# Patient Record
Sex: MALE | Race: WHITE | Employment: FULL TIME | URBAN - NONMETROPOLITAN AREA
[De-identification: names, ages, dates, MRNs, and addresses within clinical notes are randomized per-mention and may not be internally consistent; named-entity substitution may affect disease eponyms.]

---

## 2017-03-01 RX ORDER — BUTALBITAL, ACETAMINOPHEN AND CAFFEINE 50; 325; 40 MG/1; MG/1; MG/1
TABLET ORAL
Qty: 60 TABLET | Refills: 2 | Status: SHIPPED | OUTPATIENT
Start: 2017-03-01 | End: 2017-05-10 | Stop reason: SDUPTHER

## 2017-04-04 ENCOUNTER — OFFICE VISIT (OUTPATIENT)
Dept: NEUROLOGY | Age: 22
End: 2017-04-04
Payer: MEDICARE

## 2017-04-04 VITALS
WEIGHT: 241.6 LBS | SYSTOLIC BLOOD PRESSURE: 138 MMHG | BODY MASS INDEX: 32.72 KG/M2 | DIASTOLIC BLOOD PRESSURE: 92 MMHG | HEIGHT: 72 IN | HEART RATE: 105 BPM

## 2017-04-04 DIAGNOSIS — S09.90XS HEAD INJURY, SEQUELA: ICD-10-CM

## 2017-04-04 DIAGNOSIS — Z82.0 FH: MIGRAINE HEADACHE: ICD-10-CM

## 2017-04-04 DIAGNOSIS — E11.8 TYPE 2 DIABETES MELLITUS WITH COMPLICATION, WITHOUT LONG-TERM CURRENT USE OF INSULIN (HCC): ICD-10-CM

## 2017-04-04 DIAGNOSIS — E78.2 MIXED HYPERLIPIDEMIA: ICD-10-CM

## 2017-04-04 DIAGNOSIS — G44.209 TENSION HEADACHE: ICD-10-CM

## 2017-04-04 DIAGNOSIS — G43.909 MIGRAINE WITHOUT STATUS MIGRAINOSUS, NOT INTRACTABLE, UNSPECIFIED MIGRAINE TYPE: ICD-10-CM

## 2017-04-04 DIAGNOSIS — I10 ESSENTIAL HYPERTENSION: ICD-10-CM

## 2017-04-04 DIAGNOSIS — G80.9 CEREBRAL PALSY, UNSPECIFIED TYPE (HCC): ICD-10-CM

## 2017-04-04 DIAGNOSIS — G43.919 INTRACTABLE MIGRAINE WITHOUT STATUS MIGRAINOSUS, UNSPECIFIED MIGRAINE TYPE: Primary | ICD-10-CM

## 2017-04-04 DIAGNOSIS — R73.09 ELEVATED HEMOGLOBIN A1C: ICD-10-CM

## 2017-04-04 DIAGNOSIS — R26.89 BALANCE PROBLEM: ICD-10-CM

## 2017-04-04 DIAGNOSIS — Z91.81 H/O FALL: ICD-10-CM

## 2017-04-04 DIAGNOSIS — R42 DIZZINESS: ICD-10-CM

## 2017-04-04 DIAGNOSIS — R41.3 MEMORY LOSS: ICD-10-CM

## 2017-04-04 DIAGNOSIS — R53.1 LEFT-SIDED WEAKNESS: ICD-10-CM

## 2017-04-04 DIAGNOSIS — Z87.828 OLD HEAD INJURY: ICD-10-CM

## 2017-04-04 PROCEDURE — 99215 OFFICE O/P EST HI 40 MIN: CPT | Performed by: PSYCHIATRY & NEUROLOGY

## 2017-04-04 ASSESSMENT — ENCOUNTER SYMPTOMS
BLOOD IN STOOL: 0
VOICE CHANGE: 0
COLOR CHANGE: 0
DIARRHEA: 0
EYE ITCHING: 0
FACIAL SWELLING: 0
WHEEZING: 0
TROUBLE SWALLOWING: 0
EYE DISCHARGE: 0
CHOKING: 0
CONSTIPATION: 0
SHORTNESS OF BREATH: 0
ABDOMINAL DISTENTION: 0
APNEA: 0
CHEST TIGHTNESS: 0

## 2017-05-10 RX ORDER — BUTALBITAL, ACETAMINOPHEN AND CAFFEINE 50; 325; 40 MG/1; MG/1; MG/1
TABLET ORAL
Qty: 60 TABLET | Refills: 2 | Status: SHIPPED | OUTPATIENT
Start: 2017-05-10 | End: 2017-08-19 | Stop reason: SDUPTHER

## 2017-05-15 ENCOUNTER — OFFICE VISIT (OUTPATIENT)
Dept: OPTOMETRY | Age: 22
End: 2017-05-15
Payer: COMMERCIAL

## 2017-05-15 DIAGNOSIS — S05.01XA CORNEAL ABRASION, RIGHT, INITIAL ENCOUNTER: Primary | ICD-10-CM

## 2017-05-15 PROCEDURE — 99203 OFFICE O/P NEW LOW 30 MIN: CPT | Performed by: OPTOMETRIST

## 2017-05-15 RX ORDER — BENOXINATE HCL/FLUORESCEIN SOD 0.4%-0.25%
1 DROPS OPHTHALMIC (EYE) ONCE
Status: COMPLETED | OUTPATIENT
Start: 2017-05-15 | End: 2017-05-15

## 2017-05-15 RX ORDER — POLYMYXIN B SULFATE AND TRIMETHOPRIM 1; 10000 MG/ML; [USP'U]/ML
1 SOLUTION OPHTHALMIC EVERY 4 HOURS
Qty: 1 BOTTLE | Refills: 0 | Status: SHIPPED | OUTPATIENT
Start: 2017-05-15 | End: 2017-05-25

## 2017-05-15 RX ADMIN — Medication 1 DROP: at 12:07

## 2017-05-15 ASSESSMENT — VISUAL ACUITY
METHOD: SNELLEN - LINEAR
OS_SC+: -1
OD_SC: 20/100
OS_SC: 20/30

## 2017-08-21 RX ORDER — BUTALBITAL, ACETAMINOPHEN AND CAFFEINE 50; 325; 40 MG/1; MG/1; MG/1
TABLET ORAL
Qty: 60 TABLET | Refills: 2 | Status: SHIPPED | OUTPATIENT
Start: 2017-08-21 | End: 2017-11-06 | Stop reason: SDUPTHER

## 2017-11-06 RX ORDER — BUTALBITAL, ACETAMINOPHEN AND CAFFEINE 50; 325; 40 MG/1; MG/1; MG/1
TABLET ORAL
Qty: 60 TABLET | Refills: 0 | Status: SHIPPED | OUTPATIENT
Start: 2017-11-06

## 2021-07-31 ENCOUNTER — HOSPITAL ENCOUNTER (INPATIENT)
Age: 26
LOS: 6 days | Discharge: HOME | DRG: 177 | End: 2021-08-06
Attending: EMERGENCY MEDICINE | Admitting: FAMILY MEDICINE
Payer: OTHER GOVERNMENT

## 2021-07-31 ENCOUNTER — APPOINTMENT (OUTPATIENT)
Dept: GENERAL RADIOLOGY | Age: 26
DRG: 177 | End: 2021-07-31

## 2021-07-31 DIAGNOSIS — E08.10 DIABETIC KETOACIDOSIS WITHOUT COMA ASSOCIATED WITH DIABETES MELLITUS DUE TO UNDERLYING CONDITION (HCC): Primary | ICD-10-CM

## 2021-07-31 DIAGNOSIS — U07.1 COVID-19 VIRUS DETECTED: ICD-10-CM

## 2021-07-31 PROBLEM — E11.10 DKA, TYPE 2, NOT AT GOAL (HCC): Status: ACTIVE | Noted: 2021-07-31

## 2021-07-31 LAB
ABSOLUTE EOS #: <0.03 K/UL (ref 0–0.44)
ABSOLUTE IMMATURE GRANULOCYTE: 0.32 K/UL (ref 0–0.3)
ABSOLUTE LYMPH #: 1.22 K/UL (ref 1.1–3.7)
ABSOLUTE MONO #: 0.82 K/UL (ref 0.1–1.2)
ALBUMIN SERPL-MCNC: 4.4 G/DL (ref 3.5–5.2)
ALBUMIN/GLOBULIN RATIO: 1 (ref 1–2.5)
ALP BLD-CCNC: 118 U/L (ref 40–129)
ALT SERPL-CCNC: 21 U/L (ref 5–41)
ANION GAP SERPL CALCULATED.3IONS-SCNC: ABNORMAL MMOL/L (ref 9–17)
AST SERPL-CCNC: 18 U/L
BASOPHILS # BLD: 0 % (ref 0–2)
BASOPHILS ABSOLUTE: 0.06 K/UL (ref 0–0.2)
BETA-HYDROXYBUTYRATE: 7.94 MMOL/L (ref 0.02–0.27)
BILIRUB SERPL-MCNC: 0.27 MG/DL (ref 0.3–1.2)
BUN BLDV-MCNC: 21 MG/DL (ref 6–20)
BUN/CREAT BLD: 14 (ref 9–20)
CALCIUM SERPL-MCNC: 9.7 MG/DL (ref 8.6–10.4)
CHLORIDE BLD-SCNC: 88 MMOL/L (ref 98–107)
CO2: <6 MMOL/L (ref 20–31)
CREAT SERPL-MCNC: 1.49 MG/DL (ref 0.7–1.2)
DIFFERENTIAL TYPE: ABNORMAL
EOSINOPHILS RELATIVE PERCENT: 0 % (ref 1–4)
GFR AFRICAN AMERICAN: >60 ML/MIN
GFR NON-AFRICAN AMERICAN: 57 ML/MIN
GFR SERPL CREATININE-BSD FRML MDRD: ABNORMAL ML/MIN/{1.73_M2}
GFR SERPL CREATININE-BSD FRML MDRD: ABNORMAL ML/MIN/{1.73_M2}
GLUCOSE BLD-MCNC: 406 MG/DL (ref 75–110)
GLUCOSE BLD-MCNC: 451 MG/DL (ref 70–99)
GLUCOSE BLD-MCNC: 455 MG/DL (ref 75–110)
HCT VFR BLD CALC: 60.8 % (ref 40.7–50.3)
HEMOGLOBIN: 20.3 G/DL (ref 13–17)
IMMATURE GRANULOCYTES: 2 %
LYMPHOCYTES # BLD: 9 % (ref 24–43)
MCH RBC QN AUTO: 30.3 PG (ref 25.2–33.5)
MCHC RBC AUTO-ENTMCNC: 33.4 G/DL (ref 25.2–33.5)
MCV RBC AUTO: 90.9 FL (ref 82.6–102.9)
MONOCYTES # BLD: 6 % (ref 3–12)
NRBC AUTOMATED: 0 PER 100 WBC
PDW BLD-RTO: 12.2 % (ref 11.8–14.4)
PLATELET # BLD: 291 K/UL (ref 138–453)
PLATELET ESTIMATE: ABNORMAL
PMV BLD AUTO: 8.9 FL (ref 8.1–13.5)
POTASSIUM SERPL-SCNC: 4.4 MMOL/L (ref 3.7–5.3)
RBC # BLD: 6.69 M/UL (ref 4.21–5.77)
RBC # BLD: ABNORMAL 10*6/UL
SARS-COV-2, RAPID: DETECTED
SEG NEUTROPHILS: 82 % (ref 36–65)
SEGMENTED NEUTROPHILS ABSOLUTE COUNT: 11.22 K/UL (ref 1.5–8.1)
SODIUM BLD-SCNC: 130 MMOL/L (ref 135–144)
SPECIMEN DESCRIPTION: ABNORMAL
TOTAL PROTEIN: 9 G/DL (ref 6.4–8.3)
TROPONIN INTERP: NORMAL
TROPONIN T: NORMAL NG/ML
TROPONIN, HIGH SENSITIVITY: <6 NG/L (ref 0–22)
WBC # BLD: 13.6 K/UL (ref 3.5–11.3)
WBC # BLD: ABNORMAL 10*3/UL

## 2021-07-31 PROCEDURE — 82947 ASSAY GLUCOSE BLOOD QUANT: CPT

## 2021-07-31 PROCEDURE — 80053 COMPREHEN METABOLIC PANEL: CPT

## 2021-07-31 PROCEDURE — 96360 HYDRATION IV INFUSION INIT: CPT

## 2021-07-31 PROCEDURE — 2000000000 HC ICU R&B

## 2021-07-31 PROCEDURE — 93005 ELECTROCARDIOGRAM TRACING: CPT | Performed by: EMERGENCY MEDICINE

## 2021-07-31 PROCEDURE — 82010 KETONE BODYS QUAN: CPT

## 2021-07-31 PROCEDURE — 99285 EMERGENCY DEPT VISIT HI MDM: CPT

## 2021-07-31 PROCEDURE — 84484 ASSAY OF TROPONIN QUANT: CPT

## 2021-07-31 PROCEDURE — 81001 URINALYSIS AUTO W/SCOPE: CPT

## 2021-07-31 PROCEDURE — 2580000003 HC RX 258: Performed by: EMERGENCY MEDICINE

## 2021-07-31 PROCEDURE — 85025 COMPLETE CBC W/AUTO DIFF WBC: CPT

## 2021-07-31 PROCEDURE — 2500000003 HC RX 250 WO HCPCS: Performed by: EMERGENCY MEDICINE

## 2021-07-31 PROCEDURE — 87635 SARS-COV-2 COVID-19 AMP PRB: CPT

## 2021-07-31 PROCEDURE — 36415 COLL VENOUS BLD VENIPUNCTURE: CPT

## 2021-07-31 PROCEDURE — 71045 X-RAY EXAM CHEST 1 VIEW: CPT

## 2021-07-31 PROCEDURE — 6370000000 HC RX 637 (ALT 250 FOR IP): Performed by: EMERGENCY MEDICINE

## 2021-07-31 PROCEDURE — 2060000000 HC ICU INTERMEDIATE R&B

## 2021-07-31 RX ORDER — 0.9 % SODIUM CHLORIDE 0.9 %
1000 INTRAVENOUS SOLUTION INTRAVENOUS ONCE
Status: COMPLETED | OUTPATIENT
Start: 2021-07-31 | End: 2021-07-31

## 2021-07-31 RX ORDER — POTASSIUM CHLORIDE 20 MEQ/1
40 TABLET, EXTENDED RELEASE ORAL 2 TIMES DAILY
Status: DISCONTINUED | OUTPATIENT
Start: 2021-07-31 | End: 2021-08-02

## 2021-07-31 RX ORDER — SODIUM CHLORIDE, SODIUM LACTATE, POTASSIUM CHLORIDE, CALCIUM CHLORIDE 600; 310; 30; 20 MG/100ML; MG/100ML; MG/100ML; MG/100ML
INJECTION, SOLUTION INTRAVENOUS CONTINUOUS
Status: DISCONTINUED | OUTPATIENT
Start: 2021-07-31 | End: 2021-08-01

## 2021-07-31 RX ADMIN — SODIUM CHLORIDE, POTASSIUM CHLORIDE, SODIUM LACTATE AND CALCIUM CHLORIDE: 600; 310; 30; 20 INJECTION, SOLUTION INTRAVENOUS at 23:35

## 2021-07-31 RX ADMIN — POTASSIUM CHLORIDE 40 MEQ: 20 TABLET, EXTENDED RELEASE ORAL at 23:35

## 2021-07-31 RX ADMIN — SODIUM CHLORIDE 1000 ML: 9 INJECTION, SOLUTION INTRAVENOUS at 22:05

## 2021-07-31 RX ADMIN — SODIUM CHLORIDE 1000 ML: 9 INJECTION, SOLUTION INTRAVENOUS at 22:58

## 2021-07-31 RX ADMIN — Medication 5 UNITS/HR: at 23:38

## 2021-08-01 ENCOUNTER — APPOINTMENT (OUTPATIENT)
Dept: CT IMAGING | Age: 26
DRG: 177 | End: 2021-08-01

## 2021-08-01 LAB
-: ABNORMAL
AMORPHOUS: ABNORMAL
ANION GAP SERPL CALCULATED.3IONS-SCNC: 16 MMOL/L (ref 9–17)
ANION GAP SERPL CALCULATED.3IONS-SCNC: 19 MMOL/L (ref 9–17)
ANION GAP SERPL CALCULATED.3IONS-SCNC: 19 MMOL/L (ref 9–17)
ANION GAP SERPL CALCULATED.3IONS-SCNC: 20 MMOL/L (ref 9–17)
ANION GAP SERPL CALCULATED.3IONS-SCNC: 21 MMOL/L (ref 9–17)
ANION GAP SERPL CALCULATED.3IONS-SCNC: 26 MMOL/L (ref 9–17)
BACTERIA: ABNORMAL
BETA-HYDROXYBUTYRATE: 3.73 MMOL/L (ref 0.02–0.27)
BILIRUBIN URINE: NEGATIVE
BUN BLDV-MCNC: 11 MG/DL (ref 6–20)
BUN BLDV-MCNC: 13 MG/DL (ref 6–20)
BUN BLDV-MCNC: 14 MG/DL (ref 6–20)
BUN BLDV-MCNC: 15 MG/DL (ref 6–20)
BUN BLDV-MCNC: 16 MG/DL (ref 6–20)
BUN BLDV-MCNC: 19 MG/DL (ref 6–20)
BUN/CREAT BLD: 16 (ref 9–20)
BUN/CREAT BLD: 17 (ref 9–20)
BUN/CREAT BLD: 19 (ref 9–20)
BUN/CREAT BLD: 20 (ref 9–20)
C-REACTIVE PROTEIN: 15.5 MG/L (ref 0–5)
CALCIUM SERPL-MCNC: 8.4 MG/DL (ref 8.6–10.4)
CALCIUM SERPL-MCNC: 8.7 MG/DL (ref 8.6–10.4)
CALCIUM SERPL-MCNC: 8.7 MG/DL (ref 8.6–10.4)
CALCIUM SERPL-MCNC: 8.8 MG/DL (ref 8.6–10.4)
CALCIUM SERPL-MCNC: 8.9 MG/DL (ref 8.6–10.4)
CALCIUM SERPL-MCNC: 8.9 MG/DL (ref 8.6–10.4)
CASTS UA: ABNORMAL /LPF (ref 0–2)
CASTS UA: ABNORMAL /LPF (ref 0–2)
CHLORIDE BLD-SCNC: 100 MMOL/L (ref 98–107)
CHLORIDE BLD-SCNC: 100 MMOL/L (ref 98–107)
CHLORIDE BLD-SCNC: 102 MMOL/L (ref 98–107)
CHLORIDE BLD-SCNC: 103 MMOL/L (ref 98–107)
CHLORIDE BLD-SCNC: 98 MMOL/L (ref 98–107)
CHLORIDE BLD-SCNC: 99 MMOL/L (ref 98–107)
CO2: 10 MMOL/L (ref 20–31)
CO2: 11 MMOL/L (ref 20–31)
CO2: 6 MMOL/L (ref 20–31)
CO2: 7 MMOL/L (ref 20–31)
CO2: 8 MMOL/L (ref 20–31)
CO2: 9 MMOL/L (ref 20–31)
COLOR: ABNORMAL
COMMENT UA: ABNORMAL
CREAT SERPL-MCNC: 0.67 MG/DL (ref 0.7–1.2)
CREAT SERPL-MCNC: 0.69 MG/DL (ref 0.7–1.2)
CREAT SERPL-MCNC: 0.75 MG/DL (ref 0.7–1.2)
CREAT SERPL-MCNC: 0.76 MG/DL (ref 0.7–1.2)
CREAT SERPL-MCNC: 0.93 MG/DL (ref 0.7–1.2)
CREAT SERPL-MCNC: 0.98 MG/DL (ref 0.7–1.2)
CRYSTALS, UA: ABNORMAL /HPF
D-DIMER QUANTITATIVE: 2.98 MG/L FEU (ref 0–0.59)
EPITHELIAL CELLS UA: ABNORMAL /HPF (ref 0–5)
GFR AFRICAN AMERICAN: >60 ML/MIN
GFR NON-AFRICAN AMERICAN: >60 ML/MIN
GFR SERPL CREATININE-BSD FRML MDRD: ABNORMAL ML/MIN/{1.73_M2}
GLUCOSE BLD-MCNC: 101 MG/DL (ref 75–110)
GLUCOSE BLD-MCNC: 102 MG/DL (ref 70–99)
GLUCOSE BLD-MCNC: 104 MG/DL (ref 75–110)
GLUCOSE BLD-MCNC: 109 MG/DL (ref 70–99)
GLUCOSE BLD-MCNC: 114 MG/DL (ref 75–110)
GLUCOSE BLD-MCNC: 120 MG/DL (ref 75–110)
GLUCOSE BLD-MCNC: 128 MG/DL (ref 75–110)
GLUCOSE BLD-MCNC: 129 MG/DL (ref 75–110)
GLUCOSE BLD-MCNC: 134 MG/DL (ref 70–99)
GLUCOSE BLD-MCNC: 140 MG/DL (ref 75–110)
GLUCOSE BLD-MCNC: 160 MG/DL (ref 75–110)
GLUCOSE BLD-MCNC: 162 MG/DL (ref 75–110)
GLUCOSE BLD-MCNC: 162 MG/DL (ref 75–110)
GLUCOSE BLD-MCNC: 164 MG/DL (ref 75–110)
GLUCOSE BLD-MCNC: 168 MG/DL (ref 70–99)
GLUCOSE BLD-MCNC: 180 MG/DL (ref 75–110)
GLUCOSE BLD-MCNC: 201 MG/DL (ref 75–110)
GLUCOSE BLD-MCNC: 204 MG/DL (ref 75–110)
GLUCOSE BLD-MCNC: 205 MG/DL (ref 75–110)
GLUCOSE BLD-MCNC: 209 MG/DL (ref 70–99)
GLUCOSE BLD-MCNC: 219 MG/DL (ref 75–110)
GLUCOSE BLD-MCNC: 250 MG/DL (ref 70–99)
GLUCOSE BLD-MCNC: 255 MG/DL (ref 75–110)
GLUCOSE BLD-MCNC: 293 MG/DL (ref 75–110)
GLUCOSE BLD-MCNC: 315 MG/DL (ref 75–110)
GLUCOSE BLD-MCNC: 88 MG/DL (ref 75–110)
GLUCOSE BLD-MCNC: 96 MG/DL (ref 75–110)
GLUCOSE BLD-MCNC: 96 MG/DL (ref 75–110)
GLUCOSE URINE: ABNORMAL
HCT VFR BLD CALC: 51.8 % (ref 40.7–50.3)
HEMOGLOBIN: 18 G/DL (ref 13–17)
INR BLD: 1.2
KETONES, URINE: ABNORMAL
LACTIC ACID, SEPSIS WHOLE BLOOD: NORMAL MMOL/L (ref 0.5–1.9)
LACTIC ACID, SEPSIS: 1.9 MMOL/L (ref 0.5–1.9)
LEUKOCYTE ESTERASE, URINE: NEGATIVE
MAGNESIUM: 2.1 MG/DL (ref 1.6–2.6)
MAGNESIUM: 2.2 MG/DL (ref 1.6–2.6)
MCH RBC QN AUTO: 30.4 PG (ref 25.2–33.5)
MCHC RBC AUTO-ENTMCNC: 34.7 G/DL (ref 25.2–33.5)
MCV RBC AUTO: 87.5 FL (ref 82.6–102.9)
MUCUS: ABNORMAL
NITRITE, URINE: NEGATIVE
NRBC AUTOMATED: ABNORMAL PER 100 WBC
OTHER OBSERVATIONS UA: ABNORMAL
PDW BLD-RTO: 12.2 % (ref 11.8–14.4)
PH UA: 6 (ref 5–6)
PHOSPHORUS: 0.8 MG/DL (ref 2.5–4.5)
PHOSPHORUS: 1.2 MG/DL (ref 2.5–4.5)
PHOSPHORUS: 1.3 MG/DL (ref 2.5–4.5)
PHOSPHORUS: 1.6 MG/DL (ref 2.5–4.5)
PHOSPHORUS: 1.8 MG/DL (ref 2.5–4.5)
PHOSPHORUS: 2.1 MG/DL (ref 2.5–4.5)
PLATELET # BLD: 200 K/UL (ref 138–453)
PMV BLD AUTO: 8.7 FL (ref 8.1–13.5)
POTASSIUM SERPL-SCNC: 3.8 MMOL/L (ref 3.7–5.3)
POTASSIUM SERPL-SCNC: 4.1 MMOL/L (ref 3.7–5.3)
POTASSIUM SERPL-SCNC: 4.2 MMOL/L (ref 3.7–5.3)
POTASSIUM SERPL-SCNC: 4.2 MMOL/L (ref 3.7–5.3)
POTASSIUM SERPL-SCNC: 4.6 MMOL/L (ref 3.7–5.3)
POTASSIUM SERPL-SCNC: 4.8 MMOL/L (ref 3.7–5.3)
PROCALCITONIN: 0.42 NG/ML
PROTEIN UA: ABNORMAL
PROTHROMBIN TIME: 14.6 SEC (ref 11.5–14.2)
RBC # BLD: 5.92 M/UL (ref 4.21–5.77)
RBC UA: ABNORMAL /HPF (ref 0–4)
RENAL EPITHELIAL, UA: ABNORMAL /HPF
SODIUM BLD-SCNC: 126 MMOL/L (ref 135–144)
SODIUM BLD-SCNC: 128 MMOL/L (ref 135–144)
SODIUM BLD-SCNC: 129 MMOL/L (ref 135–144)
SODIUM BLD-SCNC: 129 MMOL/L (ref 135–144)
SODIUM BLD-SCNC: 130 MMOL/L (ref 135–144)
SODIUM BLD-SCNC: 132 MMOL/L (ref 135–144)
SPECIFIC GRAVITY UA: 1.03 (ref 1.01–1.02)
TRICHOMONAS: ABNORMAL
TROPONIN INTERP: NORMAL
TROPONIN T: NORMAL NG/ML
TROPONIN, HIGH SENSITIVITY: <6 NG/L (ref 0–22)
TURBIDITY: ABNORMAL
URINE HGB: ABNORMAL
UROBILINOGEN, URINE: NORMAL
WBC # BLD: 12.6 K/UL (ref 3.5–11.3)
WBC UA: ABNORMAL /HPF (ref 0–4)
YEAST: ABNORMAL

## 2021-08-01 PROCEDURE — 84145 PROCALCITONIN (PCT): CPT

## 2021-08-01 PROCEDURE — 6370000000 HC RX 637 (ALT 250 FOR IP): Performed by: NURSE PRACTITIONER

## 2021-08-01 PROCEDURE — 87040 BLOOD CULTURE FOR BACTERIA: CPT

## 2021-08-01 PROCEDURE — 87899 AGENT NOS ASSAY W/OPTIC: CPT

## 2021-08-01 PROCEDURE — 83605 ASSAY OF LACTIC ACID: CPT

## 2021-08-01 PROCEDURE — 87205 SMEAR GRAM STAIN: CPT

## 2021-08-01 PROCEDURE — 2000000000 HC ICU R&B

## 2021-08-01 PROCEDURE — 94761 N-INVAS EAR/PLS OXIMETRY MLT: CPT

## 2021-08-01 PROCEDURE — 86140 C-REACTIVE PROTEIN: CPT

## 2021-08-01 PROCEDURE — 83036 HEMOGLOBIN GLYCOSYLATED A1C: CPT

## 2021-08-01 PROCEDURE — 87449 NOS EACH ORGANISM AG IA: CPT

## 2021-08-01 PROCEDURE — 82947 ASSAY GLUCOSE BLOOD QUANT: CPT

## 2021-08-01 PROCEDURE — 2500000003 HC RX 250 WO HCPCS: Performed by: NURSE PRACTITIONER

## 2021-08-01 PROCEDURE — 84484 ASSAY OF TROPONIN QUANT: CPT

## 2021-08-01 PROCEDURE — 85610 PROTHROMBIN TIME: CPT

## 2021-08-01 PROCEDURE — 36415 COLL VENOUS BLD VENIPUNCTURE: CPT

## 2021-08-01 PROCEDURE — 6360000004 HC RX CONTRAST MEDICATION: Performed by: NURSE PRACTITIONER

## 2021-08-01 PROCEDURE — 6370000000 HC RX 637 (ALT 250 FOR IP): Performed by: FAMILY MEDICINE

## 2021-08-01 PROCEDURE — 87150 DNA/RNA AMPLIFIED PROBE: CPT

## 2021-08-01 PROCEDURE — 82010 KETONE BODYS QUAN: CPT

## 2021-08-01 PROCEDURE — 87077 CULTURE AEROBIC IDENTIFY: CPT

## 2021-08-01 PROCEDURE — 6360000002 HC RX W HCPCS: Performed by: NURSE PRACTITIONER

## 2021-08-01 PROCEDURE — 2580000003 HC RX 258: Performed by: FAMILY MEDICINE

## 2021-08-01 PROCEDURE — 2580000003 HC RX 258: Performed by: NURSE PRACTITIONER

## 2021-08-01 PROCEDURE — 71260 CT THORAX DX C+: CPT

## 2021-08-01 PROCEDURE — 87186 SC STD MICRODIL/AGAR DIL: CPT

## 2021-08-01 PROCEDURE — 99222 1ST HOSP IP/OBS MODERATE 55: CPT | Performed by: FAMILY MEDICINE

## 2021-08-01 PROCEDURE — 6370000000 HC RX 637 (ALT 250 FOR IP): Performed by: EMERGENCY MEDICINE

## 2021-08-01 PROCEDURE — 83735 ASSAY OF MAGNESIUM: CPT

## 2021-08-01 PROCEDURE — 80048 BASIC METABOLIC PNL TOTAL CA: CPT

## 2021-08-01 PROCEDURE — 85379 FIBRIN DEGRADATION QUANT: CPT

## 2021-08-01 PROCEDURE — 85027 COMPLETE CBC AUTOMATED: CPT

## 2021-08-01 PROCEDURE — 6360000002 HC RX W HCPCS: Performed by: FAMILY MEDICINE

## 2021-08-01 PROCEDURE — 84100 ASSAY OF PHOSPHORUS: CPT

## 2021-08-01 RX ORDER — SODIUM PHOSPHATE, MONOBASIC, MONOHYDRATE 276; 142 MG/ML; MG/ML
INJECTION, SOLUTION INTRAVENOUS
Status: DISPENSED
Start: 2021-08-01 | End: 2021-08-01

## 2021-08-01 RX ORDER — POLYETHYLENE GLYCOL 3350 17 G/17G
17 POWDER, FOR SOLUTION ORAL DAILY PRN
Status: DISCONTINUED | OUTPATIENT
Start: 2021-08-01 | End: 2021-08-06 | Stop reason: HOSPADM

## 2021-08-01 RX ORDER — TOPIRAMATE 100 MG/1
100 TABLET, FILM COATED ORAL 2 TIMES DAILY
Status: DISCONTINUED | OUTPATIENT
Start: 2021-08-01 | End: 2021-08-01

## 2021-08-01 RX ORDER — FAMOTIDINE 20 MG/1
20 TABLET, FILM COATED ORAL DAILY
Status: DISCONTINUED | OUTPATIENT
Start: 2021-08-01 | End: 2021-08-01

## 2021-08-01 RX ORDER — DEXTROSE AND SODIUM CHLORIDE 5; .45 G/100ML; G/100ML
INJECTION, SOLUTION INTRAVENOUS CONTINUOUS PRN
Status: DISCONTINUED | OUTPATIENT
Start: 2021-08-01 | End: 2021-08-01

## 2021-08-01 RX ORDER — POTASSIUM CHLORIDE 7.45 MG/ML
10 INJECTION INTRAVENOUS PRN
Status: DISCONTINUED | OUTPATIENT
Start: 2021-08-01 | End: 2021-08-06 | Stop reason: HOSPADM

## 2021-08-01 RX ORDER — METOPROLOL TARTRATE 5 MG/5ML
5 INJECTION INTRAVENOUS ONCE
Status: COMPLETED | OUTPATIENT
Start: 2021-08-01 | End: 2021-08-01

## 2021-08-01 RX ORDER — NAPROXEN 250 MG/1
500 TABLET ORAL 2 TIMES DAILY WITH MEALS
Status: DISCONTINUED | OUTPATIENT
Start: 2021-08-01 | End: 2021-08-01

## 2021-08-01 RX ORDER — MAGNESIUM SULFATE 1 G/100ML
1000 INJECTION INTRAVENOUS PRN
Status: DISCONTINUED | OUTPATIENT
Start: 2021-08-01 | End: 2021-08-06 | Stop reason: HOSPADM

## 2021-08-01 RX ORDER — ACETAMINOPHEN 650 MG/1
650 SUPPOSITORY RECTAL EVERY 6 HOURS PRN
Status: DISCONTINUED | OUTPATIENT
Start: 2021-08-01 | End: 2021-08-01

## 2021-08-01 RX ORDER — SODIUM CHLORIDE 9 MG/ML
INJECTION, SOLUTION INTRAVENOUS CONTINUOUS
Status: DISCONTINUED | OUTPATIENT
Start: 2021-08-01 | End: 2021-08-03

## 2021-08-01 RX ORDER — ACETAMINOPHEN 325 MG/1
650 TABLET ORAL EVERY 6 HOURS PRN
Status: DISCONTINUED | OUTPATIENT
Start: 2021-08-01 | End: 2021-08-06 | Stop reason: HOSPADM

## 2021-08-01 RX ORDER — ACETAMINOPHEN 650 MG/1
650 SUPPOSITORY RECTAL EVERY 6 HOURS PRN
Status: DISCONTINUED | OUTPATIENT
Start: 2021-08-01 | End: 2021-08-06 | Stop reason: HOSPADM

## 2021-08-01 RX ORDER — PANTOPRAZOLE SODIUM 40 MG/1
40 TABLET, DELAYED RELEASE ORAL
Status: DISCONTINUED | OUTPATIENT
Start: 2021-08-01 | End: 2021-08-02

## 2021-08-01 RX ORDER — ALBUTEROL SULFATE 90 UG/1
2 AEROSOL, METERED RESPIRATORY (INHALATION) EVERY 6 HOURS PRN
Status: DISCONTINUED | OUTPATIENT
Start: 2021-08-01 | End: 2021-08-04

## 2021-08-01 RX ORDER — ACETAMINOPHEN 325 MG/1
650 TABLET ORAL EVERY 6 HOURS PRN
Status: DISCONTINUED | OUTPATIENT
Start: 2021-08-01 | End: 2021-08-01

## 2021-08-01 RX ORDER — INSULIN GLARGINE 100 [IU]/ML
20 INJECTION, SOLUTION SUBCUTANEOUS NIGHTLY
Status: DISCONTINUED | OUTPATIENT
Start: 2021-08-01 | End: 2021-08-06 | Stop reason: HOSPADM

## 2021-08-01 RX ORDER — DEXTROSE MONOHYDRATE 25 G/50ML
12.5 INJECTION, SOLUTION INTRAVENOUS PRN
Status: DISCONTINUED | OUTPATIENT
Start: 2021-08-01 | End: 2021-08-06 | Stop reason: HOSPADM

## 2021-08-01 RX ORDER — BUTALBITAL, ACETAMINOPHEN AND CAFFEINE 50; 325; 40 MG/1; MG/1; MG/1
1 TABLET ORAL NIGHTLY
Status: DISCONTINUED | OUTPATIENT
Start: 2021-08-01 | End: 2021-08-01

## 2021-08-01 RX ORDER — GUAIFENESIN/DEXTROMETHORPHAN 100-10MG/5
5 SYRUP ORAL EVERY 4 HOURS PRN
Status: DISCONTINUED | OUTPATIENT
Start: 2021-08-01 | End: 2021-08-06 | Stop reason: HOSPADM

## 2021-08-01 RX ORDER — VITAMIN B COMPLEX
2000 TABLET ORAL DAILY
Status: DISCONTINUED | OUTPATIENT
Start: 2021-08-01 | End: 2021-08-06 | Stop reason: HOSPADM

## 2021-08-01 RX ORDER — SODIUM CHLORIDE 450 MG/100ML
INJECTION, SOLUTION INTRAVENOUS CONTINUOUS
Status: DISCONTINUED | OUTPATIENT
Start: 2021-08-01 | End: 2021-08-01

## 2021-08-01 RX ADMIN — Medication 15.6 UNITS/HR: at 10:53

## 2021-08-01 RX ADMIN — ENOXAPARIN SODIUM 30 MG: 30 INJECTION SUBCUTANEOUS at 21:15

## 2021-08-01 RX ADMIN — SODIUM PHOSPHATE, MONOBASIC, MONOHYDRATE 20 MMOL: 276; 142 INJECTION, SOLUTION INTRAVENOUS at 09:28

## 2021-08-01 RX ADMIN — AZITHROMYCIN MONOHYDRATE 500 MG: 500 INJECTION, POWDER, LYOPHILIZED, FOR SOLUTION INTRAVENOUS at 09:41

## 2021-08-01 RX ADMIN — CEFTRIAXONE 1000 MG: 1 INJECTION, POWDER, FOR SOLUTION INTRAMUSCULAR; INTRAVENOUS at 10:55

## 2021-08-01 RX ADMIN — SODIUM CHLORIDE: 4.5 INJECTION, SOLUTION INTRAVENOUS at 01:34

## 2021-08-01 RX ADMIN — POTASSIUM CHLORIDE 40 MEQ: 20 TABLET, EXTENDED RELEASE ORAL at 21:24

## 2021-08-01 RX ADMIN — NAPROXEN 500 MG: 250 TABLET ORAL at 07:43

## 2021-08-01 RX ADMIN — IOPAMIDOL 100 ML: 755 INJECTION, SOLUTION INTRAVENOUS at 03:18

## 2021-08-01 RX ADMIN — INSULIN GLARGINE 20 UNITS: 100 INJECTION, SOLUTION SUBCUTANEOUS at 21:15

## 2021-08-01 RX ADMIN — DEXTROSE AND SODIUM CHLORIDE: 5; 450 INJECTION, SOLUTION INTRAVENOUS at 09:40

## 2021-08-01 RX ADMIN — DEXTROSE AND SODIUM CHLORIDE: 5; 450 INJECTION, SOLUTION INTRAVENOUS at 03:37

## 2021-08-01 RX ADMIN — SODIUM CHLORIDE: 9 INJECTION, SOLUTION INTRAVENOUS at 12:29

## 2021-08-01 RX ADMIN — METOPROLOL TARTRATE 5 MG: 5 INJECTION INTRAVENOUS at 02:54

## 2021-08-01 RX ADMIN — ENOXAPARIN SODIUM 30 MG: 30 INJECTION SUBCUTANEOUS at 01:33

## 2021-08-01 RX ADMIN — FAMOTIDINE 20 MG: 20 TABLET ORAL at 07:43

## 2021-08-01 RX ADMIN — Medication 2000 UNITS: at 07:43

## 2021-08-01 RX ADMIN — ENOXAPARIN SODIUM 30 MG: 30 INJECTION SUBCUTANEOUS at 07:43

## 2021-08-01 RX ADMIN — SODIUM CHLORIDE: 9 INJECTION, SOLUTION INTRAVENOUS at 22:13

## 2021-08-01 ASSESSMENT — PAIN SCALES - GENERAL
PAINLEVEL_OUTOF10: 0

## 2021-08-01 NOTE — PLAN OF CARE
Problem: Airway Clearance - Ineffective  Goal: Achieve or maintain patent airway  8/1/2021 1517 by Scar Victoria RN  Outcome: Ongoing  Note: Airway assessment completed by this RN, patient has a patent airway. The patient does not have a cough, no sputum present. Patient is still encouraged to wear a mask when others are present in the room. Ambulation is encouraged. Problem: Gas Exchange - Impaired  Goal: Absence of hypoxia  8/1/2021 1517 by Scar Victoria RN  Outcome: Ongoing  Note: Lung sounds clear. Respirations are easy, unlabored, and even. No complaints of shortness of breath. Oxygen saturation >90%. Will continue to monitor. Problem: Body Temperature -  Risk of, Imbalanced  Goal: Ability to maintain a body temperature within defined limits  8/1/2021 1517 by Scar Victoria RN  Outcome: Ongoing  Note: Patient does not have a fever at this time, will monitor for a fever. If the patient has a fever will give Tylenol if indicated. Problem: Isolation Precautions - Risk of Spread of Infection  Goal: Prevent transmission of infection  8/1/2021 1517 by Scar Victoria RN  Outcome: Ongoing  Note: Patient has a droplet plus isolation order. The Droplet Plus sign is placed on the door and isolation flag hanging outside the patients door. Personal protective equipment (PPE) used by personal when entering room, easily accessible. Hand hygiene used and encouraged by the patient. Patient is aware that the patient must wear a mask when personal are present in the room/and or if the patient leaves the room. Problem: Nutrition Deficits  Goal: Optimize nutritional status  8/1/2021 1517 by Scar Victoria RN  Outcome: Ongoing  Note: Patient is on a clear liquid diet, patient is noted to have some trouble swallowing jello.  SLP ordered     Problem: Risk for Fluid Volume Deficit  Goal: Maintain normal heart rhythm  8/1/2021 1517 by Scar Victoria RN  Outcome: Ongoing  Note: Patient is sinus tach.     Problem: Risk for Fluid Volume Deficit  Goal: Maintain normal serum potassium, sodium, calcium, phosphorus, and pH  8/1/2021 1517 by Rosa Loco RN  Outcome: Ongoing  Note: Electrolytes are being monitored. Patient does have low sodium and phosphors. Patients IV fluids changed to normal saline and phosphorus is being replaced via IV. Will continue to monitor. Problem: Fatigue  Goal: Verbalize increase energy and improved vitality  8/1/2021 1517 by Rosa Loco RN  Outcome: Ongoing  Note: Patient does complain of fatigue, patient has been resting this shift. Problem: Falls - Risk of:  Goal: Will remain free from falls  Description: Will remain free from falls  Outcome: Ongoing  Note: Call light in reach, bed in lowest position, and bed alarm activated. Education given on use of call light before ambulation and when in need of assistance. Patient expressed understanding. Hourly visual checks performed and charted. Toileting offered to patient. No falls this shift, at any time. Arm band and falling star in place. Will continue to monitor. Problem: Pain:  Goal: Pain level will decrease  Description: Pain level will decrease  Outcome: Ongoing  Note: Patient able to use 0-10 pain scale. Denies pain at this time. Agreeable to take PRN pain medications. Problem: Discharge Planning:  Goal: Discharged to appropriate level of care  Description: Discharged to appropriate level of care  Outcome: Ongoing  Note: Discharge planning in process and discussed with patient/family. Social work consulted for any additional needs. Care manager aware of discharge needs. Problem: Serum Glucose Level - Abnormal:  Goal: Ability to maintain appropriate glucose levels has stabilized  Description: Ability to maintain appropriate glucose levels has stabilized  Outcome: Ongoing  Note: Patients blood sugar is being checked every hour. Insulin drip changed via orders. Will continue to monitor.

## 2021-08-01 NOTE — H&P
Hospital Medicine  History and Physical    Patient:  Alfonzo Miles  MRN: [id number]    CHIEF COMPLAINT:  Shortness of breath    History Obtained From:  patient  PCP: No primary care provider on file. HISTORY OF PRESENT ILLNESS:   The patient is a 22 y.o. male who presents with shortness of breath the past 2 days. Was in DKA patient with known h/o diabetes had not taken his medications for sometime. Patient also tested positive for covid has not bben vaccinated. Denies any fever has had a cough,denies N/V or diarrhea, no abdominal pain . Has had some problems swallowing. Chest CT with bilateral pneumonia and evidence of esophagitis. Past Medical History:        Diagnosis Date    Balance problem     CP (cerebral palsy) ([facility])     mild- left side affected    Dizziness     DM (diabetes mellitus) ([facility])     Elevated hemoglobin A1c     FH: migraine headache     H/O fall     Head injury     Headache(784.0)     HTN (hypertension)     Left hemiparesis ([facility])     Migraine     Old head injury     Tension headache        Past Surgical History:    History reviewed. No pertinent surgical history. Medications Prior to Admission:  I obtained, documented, reviewed, and updated the patient's current medications. Prior to Admission medications    Medication Sig Start Date End Date Taking?  Authorizing Provider   butalbital-acetaminophen-caffeine (FIORICET, ESGIC) -40 MG per tablet TAKE ONE TO TWO TABLETS BY MOUTH IN THE EVENING AND AT BEDTIME AS NEEDED FOR TENSION HEADACHE 11/6/17  Yes Dionna Pickard MD   naproxen (NAPROSYN) 500 MG tablet TAKE ONE TABLET BY MOUTH TWICE DAILY WITH MEALS   AS  NEEDED 8/25/16  Yes Dionna Pickard MD   ranitidine (ZANTAC) 300 MG tablet Take 1 tablet by mouth nightly 8/25/16  Yes Dionna Pickard MD   rizatriptan (MAXALT-MLT) 10 MG disintegrating tablet ONE TABLET AT ONSET OF MIGRAINE HEADACHE, MAY REPEAT IN 2 HOURS PRN, MAX DOSE IN 24 HOURS IS 2 TABLETS. 8/25/16  Yes Alexis Huntley MD   topiramate (TOPAMAX) 100 MG tablet One  Tablet  Twice  daily 8/25/16  Yes Alexis Huntley MD   metFORMIN (GLUCOPHAGE) 1000 MG tablet TAKE ONE TABLET BY MOUTH TWICE DAILY WITH MEALS 7/11/16  Yes Rosemary Cosme MD   glimepiride (AMARYL) 2 MG tablet Take 1 tablet by mouth every morning 1/4/16  Yes Rosemary Cosme MD   acetaminophen (TYLENOL) 500 MG tablet Take 1,000 mg by mouth every 4 hours as needed for Pain. Yes Historical Provider, MD       Allergies:  Imitrex [sumatriptan]    Social History:   TOBACCO:   reports that he has never smoked. He has never used smokeless tobacco.  ETOH:   reports no history of alcohol use. OCCUPATION:      Family History:       Problem Relation Age of Onset    Cataracts Neg Hx     Diabetes Neg Hx     Glaucoma Neg Hx        REVIEW OF SYSTEMS:  Constitutional:  negative for  fevers and chills  HEENT:  Denies any sorethroat,has a lazy eye left no visual complaints.   Neck:  No lumps or masses and No swollen glands  Cardiac:  negative for  chest pain, palpitations  Respiratory:  positive for  dry cough and dyspnea  Gastrointestinal:  negative  :  negative  Musculoskeletal:  negative  Neuro:  positive for headaches  Psychiatry:No depression or anxiety  Rest of ROS Negative    Physical Exam:    Vitals: /72   Pulse 107   Temp 97.5 °F (36.4 °C) (Axillary)   Resp 25   Ht 6' (1.829 m)   Wt 175 lb 14.4 oz (79.8 kg)   SpO2 96%   BMI 23.86 kg/m²   General appearance: alert, appears stated age and cooperative  Skin: Skin color, texture, turgor normal. No rashes or lesions  HEENT: Eye: positive findings: lid lag left eye  Neck: no adenopathy, no carotid bruit, no JVD, supple, symmetrical, trachea midline and thyroid not enlarged, symmetric, no tenderness/mass/nodules  Lungs: clear to auscultation bilaterally  Heart: regular rate and rhythm, S1, S2 normal, no murmur, click, rub or gallop  Abdomen: soft, non-tender; bowel sounds normal; no masses, no organomegaly  Extremities: extremities normal, atraumatic, no cyanosis or edema  Neurologic: Mental status: Alert, oriented, thought content appropriate    CBC:   Recent Labs     07/31/21 2150 08/01/21 0211   WBC 13.6* 12.6*   HGB 20.3* 18.0*    200     BMP:    Recent Labs     07/31/21 2150 08/01/21 0211 08/01/21  0727   * 130* 132*   K 4.4 4.8 4.2   CL 88* 98 103   CO2 <6* 6* 8*   BUN 21* 19 16   CREATININE 1.49* 0.98 0.93   GLUCOSE 451* 250* 168*     Hepatic:   Recent Labs     07/31/21 2150   AST 18   ALT 21   BILITOT 0.27*   ALKPHOS 118     Troponin: No results for input(s): TROPONINI in the last 72 hours. BNP: No results for input(s): BNP in the last 72 hours. Lipids: No results for input(s): CHOL, HDL in the last 72 hours. Invalid input(s): LDLCALCU  ABGs: No results found for: PHART, PO2ART, [id number]  INR:   Recent Labs     08/01/21 0211   INR 1.2     -----------------------------------------------------------------  PA/lat CXR: No acute process  EKG: Abnormal    Assessment and Plan   1. DKA. 2. Covid pneumonia  3. Hypophosphatemia  4. Clay Stabs with possible esophagitis on CT    PLAN:Patient started on DKA protocol blood sugars improving  will try to wean down insulin drip and  transition to long acting Insulin with SS coverage insulin once acidosis clears. Electrolyte replacement per protocol. Monitor serial labs. Start clear liquid and advance as tolerated. Rocephin ,Zithromax and Albuterol inhaler prn ,hold on steroids. Pharmacy consult to see if he qualifies for Remdesivir. Supportive care and supplemental oxygen if needed. Isolation precautions. DVT prohylaxisConsider echocardiogram once Covid Isolation is done. On Protonix for GI prophylaxsis and consider outpatient EGD to evaluate GERD and possible esophagitis seen on CT chest.Consult speech for swallow evaluation.               Patient Active Problem List   Diagnosis Code    Migraines I00.278    Cerebral palsy ([facility] [address] G80.9    Left-sided weakness R53.1    Dizziness R42    Balance problem R26.89    Old head injury Z87.828    DM (diabetes mellitus) ([facility]) E11.9    Tension headache G44.209    H/O fall Z91.81    HTN (hypertension) I10    FH: migraine headache Z82.0    Head injury S09.90XA    Mixed hyperlipidemia E78.2    DKA, type 2, not at goal [facility]) E11.10       DVT prophylaxis:   [x] Lovenox   [x] SCDs   [] SQ Heparin   [] Encourage ambulation, low risk for DVT, no chemical or mechanical    prophylaxis necessary      [] Already on Anticoagulation        Chuckie Raines MD, MD

## 2021-08-01 NOTE — ED PROVIDER NOTES
eMERGENCY dEPARTMENT eNCOUnter      Pt Name: Junior Brgigs  MRN: [id number]  [place] 1995  Date of evaluation: 7/31/2021      CHIEF COMPLAINT       Chief Complaint   Patient presents with    Shortness of [address]    Junior Briggs is a 22 y.o. male who presents with shortness of breath. Patient states he has been having shortness of breath for the last 2 to 3 days he has a known history of diabetes was taken off of his medicine quite some time ago does not check his sugar he denies any fevers at home had a mild cough no chills no sick contacts that he is aware of he has not been immunized for Covid        REVIEW OF SYSTEMS       Review of systems are all reviewed and negative except stated above in HPI    [address]    has a past medical history of Balance problem, CP (cerebral palsy) ([address] Dizziness, DM (diabetes mellitus) ([facility] [address] Elevated hemoglobin A1c, FH: migraine headache, H/O fall, Head injury, Headache(784.0), HTN (hypertension), Left hemiparesis ([address] Migraine, Old head injury, and Tension headache. SURGICAL HISTORY      has no past surgical history on file. CURRENT MEDICATIONS       Previous Medications    ACETAMINOPHEN (TYLENOL) 500 MG TABLET    Take 1,000 mg by mouth every 4 hours as needed for Pain.     BUTALBITAL-ACETAMINOPHEN-CAFFEINE (FIORICET, ESGIC) -40 MG PER TABLET    TAKE ONE TO TWO TABLETS BY MOUTH IN THE EVENING AND AT BEDTIME AS NEEDED FOR TENSION HEADACHE    GLIMEPIRIDE (AMARYL) 2 MG TABLET    Take 1 tablet by mouth every morning    METFORMIN (GLUCOPHAGE) 1000 MG TABLET    TAKE ONE TABLET BY MOUTH TWICE DAILY WITH MEALS    NAPROXEN (NAPROSYN) 500 MG TABLET    TAKE ONE TABLET BY MOUTH TWICE DAILY WITH MEALS   AS  NEEDED    RANITIDINE (ZANTAC) 300 MG TABLET    Take 1 tablet by mouth nightly    RIZATRIPTAN (MAXALT-MLT) 10 MG DISINTEGRATING TABLET    ONE TABLET AT ONSET OF MIGRAINE HEADACHE, MAY REPEAT IN 2 HOURS PRN, MAX DOSE IN 24 HOURS IS 2 TABLETS. TOPIRAMATE (TOPAMAX) 100 MG TABLET    One  Tablet  Twice  daily       ALLERGIES     is allergic to imitrex [sumatriptan]. FAMILY HISTORY     He indicated that his mother is alive. He indicated that his father is alive. He indicated that both of his sisters are alive. He indicated that all of his three brothers are alive. He indicated that the status of his neg hx is unknown.     family history is not on file. SOCIAL HISTORY      reports that he has never smoked. He has never used smokeless tobacco. He reports that he does not drink alcohol and does not use drugs. PHYSICAL EXAM     INITIAL VITALS:  height is 6' (1.829 m) and weight is 240 lb (108.9 kg). His tympanic temperature is 97.5 °F (36.4 °C). His blood pressure is 160/85 (abnormal) and his pulse is 124. His respiration is 32 (abnormal) and oxygen saturation is 100%. General: Patient alert nontoxic-appearing male who has a markedly elevated respiratory rate  HEENT: Head is atraumatic conjunctiva are clear mouth shows mildly dry mucous membranes  Neck: Supple  Respiratory: Lung sounds are clear bilateral  Cardiac: Heart is tachycardic without murmur  GI: Abdomen soft nontender bowel sounds normal  Skin: Warm dry no rash  Neuro: Patient is alert and oriented moves all 4 extremities well    DIFFERENTIAL DIAGNOSIS/ MDM:     Dyspnea DKA    DIAGNOSTIC RESULTS     EKG: All EKG's are interpreted by the Emergency Department Physician who either signs or Co-signs this chart in the absence of a cardiologist.    EKG interpreted by me reveals a sinus tach at 134 he has small Q waves located in leads III and aVF but no ST elevations noisy baseline throughout normal VT interval no QS complex    RADIOLOGY:   I directly visualized the following  images and reviewed the radiologist interpretations:  XR CHEST PORTABLE   Final Result   No acute process.                LABS:  Labs Reviewed   COVID-19, RAPID - Abnormal; Notable for the following components:       Result Value    SARS-CoV-2, Rapid DETECTED (*)     All other components within normal limits   CBC WITH AUTO DIFFERENTIAL - Abnormal; Notable for the following components:    WBC 13.6 (*)     RBC 6.69 (*)     Hemoglobin 20.3 (*)     Hematocrit 60.8 (*)     Seg Neutrophils 82 (*)     Lymphocytes 9 (*)     Eosinophils % 0 (*)     Immature Granulocytes 2 (*)     Segs Absolute 11.22 (*)     Absolute Immature Granulocyte 0.32 (*)     All other components within normal limits   COMPREHENSIVE METABOLIC PANEL - Abnormal; Notable for the following components:    Glucose 451 (*)     BUN 21 (*)     CREATININE 1.49 (*)     Sodium 130 (*)     Chloride 88 (*)     CO2 <6 (*)     Total Bilirubin 0.27 (*)     Total Protein 9.0 (*)     GFR Non- 57 (*)     All other components within normal limits   BETA-HYDROXYBUTYRATE - Abnormal; Notable for the following components:    Beta-Hydroxybutyrate 7.94 (*)     All other components within normal limits   POC GLUCOSE FINGERSTICK - Abnormal; Notable for the following components:    POC Glucose 455 (*)     All other components within normal limits   TROPONIN   URINALYSIS   POCT GLUCOSE         EMERGENCY DEPARTMENT COURSE:   Vitals:    Vitals:    07/31/21 2215 07/31/21 2230 07/31/21 2245 07/31/21 2315   BP: (!) 143/94 (!) 152/84 (!) 153/81 (!) 160/85   Pulse: 129 128 128 124   Resp: (!) 33 30 (!) 31 (!) 32   Temp:       TempSrc:       SpO2: 100% 100% 100% 100%   Weight:       Height:         -------------------------  BP: (!) 160/85, Temp: 97.5 °F (36.4 °C), Pulse: 124, Resp: (!) 32    Orders Placed This Encounter   Medications    0.9 % sodium chloride bolus    0.9 % sodium chloride bolus    potassium chloride (KLOR-CON M) extended release tablet 40 mEq    insulin (HumuLIN R) 100 units in sodium chloride 0.9% 100 mL infusion    lactated ringers infusion           Re-evaluation Notes    Laboratory results came back with significant metabolic acidosis with a gap glucose well over 400 beta hydroxy ketones at almost 8 he was given 2 L of fluids given p.o. potassium will be started on low-dose insulin drip I did speak with nurse practitioner on call Kerin Mortimer who is agreeable to admit    CRITICAL CARE:   CRITICAL CARE: There was a high probability of clinically significant/life threatening deterioration in this patient's condition which required my urgent intervention. Total critical care time was 30 minutes. This excludes any time for separately reportable procedures. CONSULTS:      PROCEDURES:  None    FINAL IMPRESSION      1. Diabetic ketoacidosis without coma associated with diabetes mellitus due to underlying condition ([facility] [address]    2. COVID-19 virus detected          DISPOSITION/PLAN   DISPOSITION Decision To Admit 07/31/2021 11:11:07 PM      Condition on Disposition    Fair    PATIENT REFERRED TO:  No follow-up provider specified. DISCHARGE MEDICATIONS:  New Prescriptions    No medications on file       (Please note that portions of this note were completed with a voice recognition program.  Efforts were made to edit the dictations but occasionally words are mis-transcribed.)    Mary Curtis MD,, MD, F.A.C.E.P.   Attending Emergency Physician        Mary Curtis MD  07/31/21 Oleg Walker MD  07/31/21 [id number]

## 2021-08-02 LAB
ABSOLUTE EOS #: <0.03 K/UL (ref 0–0.44)
ABSOLUTE IMMATURE GRANULOCYTE: 0.06 K/UL (ref 0–0.3)
ABSOLUTE LYMPH #: 0.66 K/UL (ref 1.1–3.7)
ABSOLUTE MONO #: 0.38 K/UL (ref 0.1–1.2)
ALBUMIN SERPL-MCNC: 2.7 G/DL (ref 3.5–5.2)
ANION GAP SERPL CALCULATED.3IONS-SCNC: 17 MMOL/L (ref 9–17)
ANION GAP SERPL CALCULATED.3IONS-SCNC: 20 MMOL/L (ref 9–17)
ANION GAP SERPL CALCULATED.3IONS-SCNC: 21 MMOL/L (ref 9–17)
BASOPHILS # BLD: 0 % (ref 0–2)
BASOPHILS ABSOLUTE: <0.03 K/UL (ref 0–0.2)
BETA-HYDROXYBUTYRATE: 5.13 MMOL/L (ref 0.02–0.27)
BUN BLDV-MCNC: 7 MG/DL (ref 6–20)
BUN BLDV-MCNC: 9 MG/DL (ref 6–20)
BUN BLDV-MCNC: 9 MG/DL (ref 6–20)
BUN/CREAT BLD: 12 (ref 9–20)
BUN/CREAT BLD: 12 (ref 9–20)
BUN/CREAT BLD: 13 (ref 9–20)
C-REACTIVE PROTEIN: 41.8 MG/L (ref 0–5)
CALCIUM SERPL-MCNC: 7.8 MG/DL (ref 8.6–10.4)
CALCIUM SERPL-MCNC: 8.5 MG/DL (ref 8.6–10.4)
CALCIUM SERPL-MCNC: 8.9 MG/DL (ref 8.6–10.4)
CHLORIDE BLD-SCNC: 101 MMOL/L (ref 98–107)
CHLORIDE BLD-SCNC: 101 MMOL/L (ref 98–107)
CHLORIDE BLD-SCNC: 102 MMOL/L (ref 98–107)
CO2: 12 MMOL/L (ref 20–31)
CO2: 12 MMOL/L (ref 20–31)
CO2: 14 MMOL/L (ref 20–31)
CREAT SERPL-MCNC: 0.6 MG/DL (ref 0.7–1.2)
CREAT SERPL-MCNC: 0.71 MG/DL (ref 0.7–1.2)
CREAT SERPL-MCNC: 0.75 MG/DL (ref 0.7–1.2)
D-DIMER QUANTITATIVE: 2.16 MG/L FEU (ref 0–0.59)
DIFFERENTIAL TYPE: ABNORMAL
EKG ATRIAL RATE: 134 BPM
EKG P AXIS: 74 DEGREES
EKG P-R INTERVAL: 148 MS
EKG Q-T INTERVAL: 296 MS
EKG QRS DURATION: 52 MS
EKG QTC CALCULATION (BAZETT): 442 MS
EKG R AXIS: 104 DEGREES
EKG T AXIS: 79 DEGREES
EKG VENTRICULAR RATE: 134 BPM
EOSINOPHILS RELATIVE PERCENT: 0 % (ref 1–4)
ESTIMATED AVERAGE GLUCOSE: 361 MG/DL
ESTIMATED AVERAGE GLUCOSE: 378 MG/DL
FERRITIN: 1275 UG/L (ref 30–400)
FIBRINOGEN: 357 MG/DL (ref 140–420)
GFR AFRICAN AMERICAN: >60 ML/MIN
GFR NON-AFRICAN AMERICAN: >60 ML/MIN
GFR SERPL CREATININE-BSD FRML MDRD: ABNORMAL ML/MIN/{1.73_M2}
GLUCOSE BLD-MCNC: 119 MG/DL (ref 75–110)
GLUCOSE BLD-MCNC: 120 MG/DL (ref 75–110)
GLUCOSE BLD-MCNC: 120 MG/DL (ref 75–110)
GLUCOSE BLD-MCNC: 122 MG/DL (ref 75–110)
GLUCOSE BLD-MCNC: 127 MG/DL (ref 75–110)
GLUCOSE BLD-MCNC: 130 MG/DL (ref 70–99)
GLUCOSE BLD-MCNC: 135 MG/DL (ref 75–110)
GLUCOSE BLD-MCNC: 140 MG/DL (ref 75–110)
GLUCOSE BLD-MCNC: 147 MG/DL (ref 75–110)
GLUCOSE BLD-MCNC: 149 MG/DL (ref 75–110)
GLUCOSE BLD-MCNC: 150 MG/DL (ref 75–110)
GLUCOSE BLD-MCNC: 150 MG/DL (ref 75–110)
GLUCOSE BLD-MCNC: 151 MG/DL (ref 75–110)
GLUCOSE BLD-MCNC: 157 MG/DL (ref 70–99)
GLUCOSE BLD-MCNC: 157 MG/DL (ref 75–110)
GLUCOSE BLD-MCNC: 158 MG/DL (ref 75–110)
GLUCOSE BLD-MCNC: 159 MG/DL (ref 70–99)
GLUCOSE BLD-MCNC: 160 MG/DL (ref 75–110)
GLUCOSE BLD-MCNC: 161 MG/DL (ref 75–110)
GLUCOSE BLD-MCNC: 164 MG/DL (ref 75–110)
GLUCOSE BLD-MCNC: 168 MG/DL (ref 75–110)
GLUCOSE BLD-MCNC: 168 MG/DL (ref 75–110)
GLUCOSE BLD-MCNC: 169 MG/DL (ref 75–110)
GLUCOSE BLD-MCNC: 173 MG/DL (ref 75–110)
GLUCOSE BLD-MCNC: 174 MG/DL (ref 75–110)
GLUCOSE BLD-MCNC: 177 MG/DL (ref 75–110)
GLUCOSE BLD-MCNC: 197 MG/DL (ref 75–110)
HBA1C MFR BLD: 14.2 % (ref 4–6)
HBA1C MFR BLD: 14.8 % (ref 4–6)
HCT VFR BLD CALC: 46.1 % (ref 40.7–50.3)
HEMOGLOBIN: 16.3 G/DL (ref 13–17)
IMMATURE GRANULOCYTES: 1 %
LACTATE DEHYDROGENASE: 266 U/L (ref 135–225)
LV EF: 60 %
LVEF MODALITY: NORMAL
LYMPHOCYTES # BLD: 7 % (ref 24–43)
MCH RBC QN AUTO: 30.2 PG (ref 25.2–33.5)
MCHC RBC AUTO-ENTMCNC: 35.4 G/DL (ref 25.2–33.5)
MCV RBC AUTO: 85.4 FL (ref 82.6–102.9)
MONOCYTES # BLD: 4 % (ref 3–12)
NRBC AUTOMATED: 0 PER 100 WBC
PDW BLD-RTO: 12.6 % (ref 11.8–14.4)
PHOSPHORUS: 1.5 MG/DL (ref 2.5–4.5)
PHOSPHORUS: 1.5 MG/DL (ref 2.5–4.5)
PHOSPHORUS: 1.7 MG/DL (ref 2.5–4.5)
PLATELET # BLD: 164 K/UL (ref 138–453)
PLATELET ESTIMATE: ABNORMAL
PMV BLD AUTO: 8.5 FL (ref 8.1–13.5)
POTASSIUM SERPL-SCNC: 2.9 MMOL/L (ref 3.7–5.3)
POTASSIUM SERPL-SCNC: 3.2 MMOL/L (ref 3.7–5.3)
POTASSIUM SERPL-SCNC: 3.9 MMOL/L (ref 3.7–5.3)
RBC # BLD: 5.4 M/UL (ref 4.21–5.77)
RBC # BLD: ABNORMAL 10*6/UL
SEG NEUTROPHILS: 88 % (ref 36–65)
SEGMENTED NEUTROPHILS ABSOLUTE COUNT: 7.84 K/UL (ref 1.5–8.1)
SODIUM BLD-SCNC: 133 MMOL/L (ref 135–144)
SODIUM BLD-SCNC: 133 MMOL/L (ref 135–144)
SODIUM BLD-SCNC: 134 MMOL/L (ref 135–144)
VITAMIN D 25-HYDROXY: 7.8 NG/ML (ref 30–100)
WBC # BLD: 9 K/UL (ref 3.5–11.3)
WBC # BLD: ABNORMAL 10*3/UL

## 2021-08-02 PROCEDURE — 85384 FIBRINOGEN ACTIVITY: CPT

## 2021-08-02 PROCEDURE — 6360000002 HC RX W HCPCS: Performed by: NURSE PRACTITIONER

## 2021-08-02 PROCEDURE — 6370000000 HC RX 637 (ALT 250 FOR IP): Performed by: FAMILY MEDICINE

## 2021-08-02 PROCEDURE — 82010 KETONE BODYS QUAN: CPT

## 2021-08-02 PROCEDURE — 2500000003 HC RX 250 WO HCPCS

## 2021-08-02 PROCEDURE — 85025 COMPLETE CBC W/AUTO DIFF WBC: CPT

## 2021-08-02 PROCEDURE — 6370000000 HC RX 637 (ALT 250 FOR IP): Performed by: NURSE PRACTITIONER

## 2021-08-02 PROCEDURE — C9113 INJ PANTOPRAZOLE SODIUM, VIA: HCPCS | Performed by: INTERNAL MEDICINE

## 2021-08-02 PROCEDURE — 2500000003 HC RX 250 WO HCPCS: Performed by: INTERNAL MEDICINE

## 2021-08-02 PROCEDURE — 2580000003 HC RX 258: Performed by: INTERNAL MEDICINE

## 2021-08-02 PROCEDURE — 93306 TTE W/DOPPLER COMPLETE: CPT

## 2021-08-02 PROCEDURE — 84100 ASSAY OF PHOSPHORUS: CPT

## 2021-08-02 PROCEDURE — 2000000000 HC ICU R&B

## 2021-08-02 PROCEDURE — 82040 ASSAY OF SERUM ALBUMIN: CPT

## 2021-08-02 PROCEDURE — 99232 SBSQ HOSP IP/OBS MODERATE 35: CPT | Performed by: INTERNAL MEDICINE

## 2021-08-02 PROCEDURE — 82947 ASSAY GLUCOSE BLOOD QUANT: CPT

## 2021-08-02 PROCEDURE — 6370000000 HC RX 637 (ALT 250 FOR IP): Performed by: INTERNAL MEDICINE

## 2021-08-02 PROCEDURE — 2580000003 HC RX 258: Performed by: FAMILY MEDICINE

## 2021-08-02 PROCEDURE — 92610 EVALUATE SWALLOWING FUNCTION: CPT | Performed by: SPEECH-LANGUAGE PATHOLOGIST

## 2021-08-02 PROCEDURE — 82306 VITAMIN D 25 HYDROXY: CPT

## 2021-08-02 PROCEDURE — 94760 N-INVAS EAR/PLS OXIMETRY 1: CPT

## 2021-08-02 PROCEDURE — 82728 ASSAY OF FERRITIN: CPT

## 2021-08-02 PROCEDURE — 6360000002 HC RX W HCPCS: Performed by: INTERNAL MEDICINE

## 2021-08-02 PROCEDURE — 86140 C-REACTIVE PROTEIN: CPT

## 2021-08-02 PROCEDURE — 80048 BASIC METABOLIC PNL TOTAL CA: CPT

## 2021-08-02 PROCEDURE — 6360000002 HC RX W HCPCS

## 2021-08-02 PROCEDURE — 36415 COLL VENOUS BLD VENIPUNCTURE: CPT

## 2021-08-02 PROCEDURE — 85379 FIBRIN DEGRADATION QUANT: CPT

## 2021-08-02 PROCEDURE — 83615 LACTATE (LD) (LDH) ENZYME: CPT

## 2021-08-02 PROCEDURE — 83036 HEMOGLOBIN GLYCOSYLATED A1C: CPT

## 2021-08-02 RX ORDER — SODIUM CHLORIDE 9 MG/ML
10 INJECTION INTRAVENOUS DAILY
Status: DISCONTINUED | OUTPATIENT
Start: 2021-08-02 | End: 2021-08-06 | Stop reason: HOSPADM

## 2021-08-02 RX ORDER — DEXTROSE MONOHYDRATE 50 MG/ML
100 INJECTION, SOLUTION INTRAVENOUS PRN
Status: DISCONTINUED | OUTPATIENT
Start: 2021-08-02 | End: 2021-08-06 | Stop reason: HOSPADM

## 2021-08-02 RX ORDER — GLIMEPIRIDE 2 MG/1
2 TABLET ORAL
Status: DISCONTINUED | OUTPATIENT
Start: 2021-08-03 | End: 2021-08-03

## 2021-08-02 RX ORDER — DEXTROSE MONOHYDRATE 25 G/50ML
12.5 INJECTION, SOLUTION INTRAVENOUS PRN
Status: DISCONTINUED | OUTPATIENT
Start: 2021-08-02 | End: 2021-08-06 | Stop reason: HOSPADM

## 2021-08-02 RX ORDER — PANTOPRAZOLE SODIUM 40 MG/10ML
40 INJECTION, POWDER, LYOPHILIZED, FOR SOLUTION INTRAVENOUS 2 TIMES DAILY
Status: DISCONTINUED | OUTPATIENT
Start: 2021-08-02 | End: 2021-08-06 | Stop reason: HOSPADM

## 2021-08-02 RX ORDER — PIPERACILLIN SODIUM, TAZOBACTAM SODIUM 3; .375 G/15ML; G/15ML
INJECTION, POWDER, LYOPHILIZED, FOR SOLUTION INTRAVENOUS
Status: DISPENSED
Start: 2021-08-02 | End: 2021-08-02

## 2021-08-02 RX ORDER — ONDANSETRON 2 MG/ML
4 INJECTION INTRAMUSCULAR; INTRAVENOUS EVERY 6 HOURS PRN
Status: DISCONTINUED | OUTPATIENT
Start: 2021-08-02 | End: 2021-08-06 | Stop reason: HOSPADM

## 2021-08-02 RX ORDER — NICOTINE POLACRILEX 4 MG
15 LOZENGE BUCCAL PRN
Status: DISCONTINUED | OUTPATIENT
Start: 2021-08-02 | End: 2021-08-06 | Stop reason: HOSPADM

## 2021-08-02 RX ORDER — POTASSIUM CHLORIDE 20 MEQ/1
40 TABLET, EXTENDED RELEASE ORAL ONCE
Status: DISCONTINUED | OUTPATIENT
Start: 2021-08-02 | End: 2021-08-02

## 2021-08-02 RX ORDER — 0.9 % SODIUM CHLORIDE 0.9 %
1000 INTRAVENOUS SOLUTION INTRAVENOUS ONCE
Status: COMPLETED | OUTPATIENT
Start: 2021-08-02 | End: 2021-08-02

## 2021-08-02 RX ORDER — FLUCONAZOLE 2 MG/ML
200 INJECTION, SOLUTION INTRAVENOUS EVERY 24 HOURS
Status: DISCONTINUED | OUTPATIENT
Start: 2021-08-02 | End: 2021-08-05 | Stop reason: ALTCHOICE

## 2021-08-02 RX ORDER — SODIUM PHOSPHATE, MONOBASIC, MONOHYDRATE 276; 142 MG/ML; MG/ML
INJECTION, SOLUTION INTRAVENOUS
Status: COMPLETED
Start: 2021-08-02 | End: 2021-08-02

## 2021-08-02 RX ORDER — METOPROLOL TARTRATE 5 MG/5ML
5 INJECTION INTRAVENOUS EVERY 6 HOURS
Status: DISCONTINUED | OUTPATIENT
Start: 2021-08-02 | End: 2021-08-03

## 2021-08-02 RX ORDER — ONDANSETRON 2 MG/ML
INJECTION INTRAMUSCULAR; INTRAVENOUS
Status: COMPLETED
Start: 2021-08-02 | End: 2021-08-02

## 2021-08-02 RX ADMIN — PANTOPRAZOLE SODIUM 40 MG: 40 INJECTION, POWDER, FOR SOLUTION INTRAVENOUS at 21:10

## 2021-08-02 RX ADMIN — CEFEPIME 2000 MG: 2 INJECTION, POWDER, FOR SOLUTION INTRAVENOUS at 09:03

## 2021-08-02 RX ADMIN — ONDANSETRON 4 MG: 2 INJECTION INTRAMUSCULAR; INTRAVENOUS at 05:35

## 2021-08-02 RX ADMIN — SODIUM CHLORIDE 1000 ML: 9 INJECTION, SOLUTION INTRAVENOUS at 09:01

## 2021-08-02 RX ADMIN — ENOXAPARIN SODIUM 30 MG: 30 INJECTION SUBCUTANEOUS at 08:21

## 2021-08-02 RX ADMIN — POTASSIUM BICARBONATE 40 MEQ: 782 TABLET, EFFERVESCENT ORAL at 23:09

## 2021-08-02 RX ADMIN — METOPROLOL TARTRATE 5 MG: 5 INJECTION INTRAVENOUS at 16:38

## 2021-08-02 RX ADMIN — INSULIN GLARGINE 20 UNITS: 100 INJECTION, SOLUTION SUBCUTANEOUS at 21:13

## 2021-08-02 RX ADMIN — SODIUM CHLORIDE, PRESERVATIVE FREE 10 ML: 5 INJECTION INTRAVENOUS at 21:12

## 2021-08-02 RX ADMIN — SODIUM CHLORIDE 1000 ML: 9 INJECTION, SOLUTION INTRAVENOUS at 16:37

## 2021-08-02 RX ADMIN — SODIUM CHLORIDE 1000 ML: 9 INJECTION, SOLUTION INTRAVENOUS at 12:18

## 2021-08-02 RX ADMIN — ACETAMINOPHEN 650 MG: 325 TABLET ORAL at 04:21

## 2021-08-02 RX ADMIN — CEFEPIME 2000 MG: 2 INJECTION, POWDER, FOR SOLUTION INTRAVENOUS at 19:56

## 2021-08-02 RX ADMIN — NYSTATIN 500000 UNITS: 100000 SUSPENSION ORAL at 16:41

## 2021-08-02 RX ADMIN — SODIUM CHLORIDE: 9 INJECTION, SOLUTION INTRAVENOUS at 19:56

## 2021-08-02 RX ADMIN — ONDANSETRON 4 MG: 2 INJECTION INTRAMUSCULAR; INTRAVENOUS at 20:20

## 2021-08-02 RX ADMIN — POTASSIUM CHLORIDE 10 MEQ: 7.46 INJECTION, SOLUTION INTRAVENOUS at 23:09

## 2021-08-02 RX ADMIN — FLUCONAZOLE 200 MG: 2 INJECTION, SOLUTION INTRAVENOUS at 14:07

## 2021-08-02 RX ADMIN — INSULIN LISPRO 3 UNITS: 100 INJECTION, SOLUTION INTRAVENOUS; SUBCUTANEOUS at 08:30

## 2021-08-02 RX ADMIN — POTASSIUM BICARBONATE 40 MEQ: 782 TABLET, EFFERVESCENT ORAL at 14:46

## 2021-08-02 RX ADMIN — NYSTATIN 500000 UNITS: 100000 SUSPENSION ORAL at 21:37

## 2021-08-02 RX ADMIN — METOPROLOL TARTRATE 5 MG: 5 INJECTION INTRAVENOUS at 22:36

## 2021-08-02 RX ADMIN — SODIUM CHLORIDE, PRESERVATIVE FREE 10 ML: 5 INJECTION INTRAVENOUS at 21:11

## 2021-08-02 RX ADMIN — SODIUM PHOSPHATE, MONOBASIC, MONOHYDRATE 26.34 MMOL: 276; 142 INJECTION, SOLUTION INTRAVENOUS at 16:47

## 2021-08-02 RX ADMIN — POTASSIUM CHLORIDE 10 MEQ: 7.46 INJECTION, SOLUTION INTRAVENOUS at 21:11

## 2021-08-02 RX ADMIN — FAMOTIDINE 20 MG: 10 INJECTION, SOLUTION INTRAVENOUS at 21:10

## 2021-08-02 RX ADMIN — SODIUM CHLORIDE: 9 INJECTION, SOLUTION INTRAVENOUS at 08:22

## 2021-08-02 RX ADMIN — INSULIN LISPRO 3 UNITS: 100 INJECTION, SOLUTION INTRAVENOUS; SUBCUTANEOUS at 12:18

## 2021-08-02 RX ADMIN — ENOXAPARIN SODIUM 30 MG: 30 INJECTION SUBCUTANEOUS at 21:11

## 2021-08-02 RX ADMIN — SODIUM PHOSPHATE, MONOBASIC, MONOHYDRATE 15 MMOL: 276; 142 INJECTION, SOLUTION INTRAVENOUS at 00:34

## 2021-08-02 RX ADMIN — POTASSIUM CHLORIDE 10 MEQ: 7.46 INJECTION, SOLUTION INTRAVENOUS at 22:36

## 2021-08-02 ASSESSMENT — PAIN SCALES - GENERAL
PAINLEVEL_OUTOF10: 0

## 2021-08-02 NOTE — PROGRESS NOTES
Hospitalist Progress Note    Patient:  Junior Briggs     YOB: 1995    MRN: [id number]   Admit date: 7/31/2021     Acct: [de-identified]     PCP: No primary care provider on file. CC--Interval History: Covid--19--PNA--on antibiotics----med nebs---O2 as required---likely exposure = co-workers at [id number]    Pseudomonas aeruginosa bacteremia---changed antibiotics to Cefepime---dc'd Rocephin-Zithromax--Zosyn    DKA---DM2---uncontrolled---on insulin gtt---started Amaryl 2 mg PO daily    Sinus tachycardia---IVF fluids and Lopressor    Cerbral palsy----stable    See note below         All other ROS negative except noted in HPI    Diet:  ADULT DIET;  Clear Liquid    Medications:  Scheduled Meds:   piperacillin-tazobactam        cefepime  2,000 mg Intravenous Q12H    sodium chloride  1,000 mL Intravenous Once    Vitamin D  2,000 Units Oral Daily    enoxaparin  30 mg Subcutaneous BID    pantoprazole  40 mg Oral QAM AC    insulin glargine  20 Units Subcutaneous Nightly    insulin lispro  0-18 Units Subcutaneous TID WC    insulin lispro  0-9 Units Subcutaneous Nightly    potassium chloride  40 mEq Oral BID     Continuous Infusions:   sodium chloride 100 mL/hr at 08/02/21 0822    insulin 1.94 Units/hr (08/02/21 1106)     PRN Meds:ondansetron, dextrose, potassium chloride, magnesium sulfate, sodium phosphate IVPB **OR** sodium phosphate IVPB **OR** sodium phosphate IVPB, polyethylene glycol, guaiFENesin-dextromethorphan, acetaminophen **OR** acetaminophen, albuterol sulfate HFA    Objective:  Labs:  CBC with Differential:    Lab Results   Component Value Date    WBC 9.0 08/02/2021    RBC 5.40 08/02/2021    HGB 16.3 08/02/2021    HCT 46.1 08/02/2021     08/02/2021    MCV 85.4 08/02/2021    MCH 30.2 08/02/2021    MCHC 35.4 08/02/2021    RDW 12.6 08/02/2021    LYMPHOPCT 7 08/02/2021    MONOPCT 4 08/02/2021    BASOPCT 0 08/02/2021    MONOSABS 0.38 08/02/2021    LYMPHSABS 0.66 08/02/2021    EOSABS <0.03 beta-hydroxybuterate---5.13  Covid-19---pneumonia---7.31.2021---source likely [employer] co-workers with Covid-19----onset 7.29.2021           Elevated dehydration           CTA chest---pulmonary---7.31.2021---no PE--bilateral patchy pneumonia with area of confluent                                      consolidation at the anterolateral RLLL--moderate-to-severe mural thickening                                      of nearly the entire visualized esophagus suggestive of marked esophagitis----                                      old granulomatous disease---liver may be slightly enlarged             CXR---7.31.2021---NACs  Sinus tachycardia----rhythm strip---sinus tachycardia---128  Dehydration--7.3.1.2021    Pseudomonas aeruginosa--[+]--BLOOD CULTURE              2D ECHO---8.2.2021---NLVSF--NRVSF---normal appearing valves and chambers---normal                                   AR 3.4 cm--IVC not visualized--no obvious vegetations--no DD---LVEF ~ 60%       GERD---esophagitis  Dysphagia---possible due to GERD  Hyponatremia     Cerebral palsy          Left-sided hemiparesis          Gait-balance instability--falls  Hypertension   Hyperlipidemia  Headaches         Tension         Migraine  Elevated HgbA1c  PMH:  dizziness, old CHI  PSH:   none listed    Allergies:        NKDA  Intolerances:  Imitrex--nausea---vomiting      Plan:  1. Covid-19 PNA----IV antibiotics--see change above  2.   P. aeruginoas bacteremia----2D  ECHO--Cefepime  3. DM2---Amaryl 2 mg PO daily---insulin gtt  4.    See orders    Electronically signed by Artur Mata on 8/2/2021 at 12:22 PM    Hospitalist

## 2021-08-02 NOTE — PROGRESS NOTES
Nutrition Note    Dietitian consult placed for diet education: DKA type 2 not at goal. Patient with COVID-19 virus and placed in droplet plus precautions. Unable to reach patient- will try again 8/4- if patient would like Estelita Mtz is able to print off education and give it to dietary to include with meal trays. Contact information available for questions/concerns.     Manas Trujillo RD, LD  Office Number: [phone]

## 2021-08-02 NOTE — PROGRESS NOTES
SW attempted to contact patient to assess needs. No answer.       Electronically signed by ИРИНА Carl on 8/2/2021 at 2:26 PM

## 2021-08-02 NOTE — PROGRESS NOTES
[facility] Inpatient Speech Therapy  Phone: [phone]  Fax: [phone]        BEDSIDE SWALLOW EVALUATION    YOB: 1995  Gender: male  MRN:  [id number]  Referring Physician: Dr. Jamey Finn  Diagnosis:  COVID-19 pnuemonia  Secondary Diagnosis: pharyngoesophageal dysphagia    History of Present Illness/Injury: Patient is a 32 y.o. male who presented to ED on 07/31/21 with shortness of breath. He was admitted to hospital with COVID-19 pneumonia and diabetic ketoacidosis without coma. Patient has had some difficulty swallowing, bringing foods back up. Chest CT revealed bilateral pneumonia and evidence of esophagitis.     Past Medical History:   Diagnosis Date    Balance problem     CP (cerebral palsy) ([facility])     mild- left side affected    Dizziness     DM (diabetes mellitus) ([facility])     Elevated hemoglobin A1c     FH: migraine headache     H/O fall     Head injury     Headache(784.0)     HTN (hypertension)     Left hemiparesis ([facility])     Migraine     Old head injury     Tension headache        Pain: [x]No     []Yes           Location:  N/A       Pain Rating (0-10 pain scale): 0       Pain Description:  N/A    TIME IN: 11:48  TIME OUT: 12:05 pm  TOTAL TIME: 17 minutes     Current Diet: [] NPO [] Regular diet  [] Soft and bite sized (Dysphagia III)  [] Minced and moist (Dysphagia II)   [] Pureed (Dysphagia I)  [x] Liquidised (regular with thin prior to hosptialization)         Current Liquids: [x] Thin  [] Mildly Thick (Nectar thick)  [] Moderately Thick (Honey thick) [] Extremely Thick (Spoon-Pudding)    Respiratory Status: [x] Independent [] Nasal Cannula [] Oxygen Mask                  [] Tracheostomy [] Other:      [] Ventilator/Settings:    Behavioral Observation: [x] Alert  [x] Oriented [] Confused      [] Lethargic [] Dysarthric       [] Limited Direction Following        [] Agitated      [] Other:    ORAL MECHANISM EVALUATION:               Comments:  Facial Everlena Form [x]WFL []Impaired []DNT    Lingual [x]WFL []Impaired []DNT    Dentition [x]WFL []Impaired []DNT    Velum [x]WFL []Impaired []DNT    Vocal Quality [x]WFL []Impaired []DNT    Sensation [x]WFL []Impaired []DNT    Cough [x]WFL []Impaired []DNT    Gag []WFL []Impaired [x]DNT    Other: []WFL []ImpaIred []DNT        PATIENT WAS EVALUATED USING:  [x] Thin liquid    [] Mildly thick (Nectar thick) liquid  [] Moderately thick (Honey thick) liquid/Liquidised  [] Extremely thick (Pudding thick) liquid  [x] Solid   [] Soft and bite sized   [] Minced and moist  [x] Puree    ORAL PHASE: [x] WFL    [] Impaired:   [] Loss of bolus from lips    [] Impaired AP movement   [] Pocketing Left     [] Pocketing Right    [] Lingual Pumping    [] Impaired Mastication   [] Reduced Bolus Formation    [] Impaired Oral Initiation    [] OTHER:    PHARYNGEAL PHASE:   [] WFL: Pharyngeal phase appears WFLs, but cannot completely rule out pharyngeal phase deficits from a bedside swallow evaluation alone. [x] Impaired:   [] Delayed Swallow     [] Decreased Hyolaryngeal Elevation   [] Audible Swallow   [] Suspected Pharyngeal Residue due to spontaneous multiple swallow. [x] OTHER: immediate dry heave with swallow of any consistency other than thin liquids, patient notes sensation of bolus reaching level of thyroid before feeling he needs to regurgutate. SIGNS AND SYMPTOMS OF LARYNGEAL PENETRATION / ASPIRATION:  [x] NO sign/symptoms of aspiration evident with this evaluation, but cannot rule out silent aspiration. [] Throat Clear     [] Immediate Cough  [] Delayed Cough  [] Wet Vocal Quality  [] Change in Pulmonary Status   [] OTHER:    IMPRESSIONS: Patient presents with suspected pharyngoesophageal dysphagia characterized by sensation of food regurgitation once bolus reaches area of the thyroid. Occurs with food substances, thin liquids ok.   Further assessment of swallow would be beneficial to determine any pharyngeal dysfunction versus effects of esophagitis. Unable to complete MBSS d/t isolation from COVID-19 pneumonia until patient outside 10 day window. RECOMMENDATIONS:   Modified Barium Swallow:  [x] Is indicated to further assess HOWEVER-Unable to complete MBSS d/t isolation from COVID-19 pneumonia until patient outside 10 day window. [] Is NOT indicated at this time; Will recommend as appropriate. DIET RECOMMENDATIONS: [] NPO [] Regular diet [] Soft and bite sized (Dysphagia III)  [] Minced and moist (Dysphagia II)   [] Pureed (Dysphagia I)  [x] Liquidised     LIQUID RECOMMENDATIONS:   [] Thin [] Mildly Thick (Nectar thick)  [] Moderately Thick (Honey thick) [] Extremely Thick (Spoon-Pudding)    STRATEGIES:   [] Strategies pending MBS results. [] Full upright position    [] Small bite/sip   [] No Straw   [] Multiple Swallow  [] Chin tuck   [] Head turn  [] Pulmonary monitoring  [] Oral care after all meals  [] Supervision   [] Medication in applesauce   [] Direct 1:1 Supervision  [] Spoon all liquids  [] Alternate solid / liquid  [] Limit distractions   [] Monitor for fatigue  [] OTHER:    PATIENT STRENGTHS:  [x] Motivated    [x] Cooperative [] Good family support    [] Prior Level of Function [] OTHER:      EDUCATION:    Method of Education:   [x] Discussion     [] Demonstration    [] Written     [] Other    Evaluation of Patients Response to Education:        [x] Patient and or caregiver verbalized understanding  [] Patient and or Caregiver demonstrated without assistance  [] Patient and or Caregiver demonstrated with assistance  [] Needs additional instruction to demonstrate understanding of education    PATIENT GOALS: [] Pt did not state. Will further assess during treatment. [] Return to the least restricted diet possible     [x] Return to previous level of function     [] OTHER:    PLAN / TREATMENT RECOMMENDATIONS:  [] No further speech therapy services indicated.   [] Speech Therapy evaluation to assess speech, language, cognition and/or voice  [x] Recommendations pending MBSS-Recommend MBSS once patient outside 10 day window of COVID-19 pneumonia  [] Skilled SLP intervention on IP Rehab 1x per day 5 days per week. [] Skilled SLP intervention on acute care 3-5 x per week.   [] OTHER:          Electronically signed by:     Arpan Boyer [id number]     Date:8/2/2021

## 2021-08-02 NOTE — PROGRESS NOTES
SW attempted to contact patient to complete assessment. No answer.       Electronically signed by ИРИНА Meehan on 8/2/2021 at 2:26 PM

## 2021-08-02 NOTE — PROGRESS NOTES
SW attempted to contact with patient to complete assessment and ACP note. Patient did not answer. SW will continue to monitor needs and assist as appropriate.      Electronically signed by ИРИНА Carl on 8/2/2021 at 1:54 PM

## 2021-08-03 ENCOUNTER — APPOINTMENT (OUTPATIENT)
Dept: GENERAL RADIOLOGY | Age: 26
DRG: 177 | End: 2021-08-03

## 2021-08-03 PROBLEM — E87.6 HYPOKALEMIA: Status: ACTIVE | Noted: 2021-08-02

## 2021-08-03 PROBLEM — J12.82 PNEUMONIA DUE TO COVID-19 VIRUS: Status: ACTIVE | Noted: 2021-07-31

## 2021-08-03 PROBLEM — U07.1 PNEUMONIA DUE TO COVID-19 VIRUS: Status: ACTIVE | Noted: 2021-07-31

## 2021-08-03 LAB
ANION GAP SERPL CALCULATED.3IONS-SCNC: 17 MMOL/L (ref 9–17)
ANION GAP SERPL CALCULATED.3IONS-SCNC: 19 MMOL/L (ref 9–17)
ANION GAP SERPL CALCULATED.3IONS-SCNC: 21 MMOL/L (ref 9–17)
ANION GAP SERPL CALCULATED.3IONS-SCNC: 21 MMOL/L (ref 9–17)
BUN BLDV-MCNC: 5 MG/DL (ref 6–20)
BUN BLDV-MCNC: 5 MG/DL (ref 6–20)
BUN BLDV-MCNC: 6 MG/DL (ref 6–20)
BUN BLDV-MCNC: 6 MG/DL (ref 6–20)
BUN/CREAT BLD: 11 (ref 9–20)
BUN/CREAT BLD: 12 (ref 9–20)
BUN/CREAT BLD: 8 (ref 9–20)
BUN/CREAT BLD: 9 (ref 9–20)
C-REACTIVE PROTEIN: 92.7 MG/L (ref 0–5)
CALCIUM SERPL-MCNC: 7.9 MG/DL (ref 8.6–10.4)
CALCIUM SERPL-MCNC: 7.9 MG/DL (ref 8.6–10.4)
CALCIUM SERPL-MCNC: 8.1 MG/DL (ref 8.6–10.4)
CALCIUM SERPL-MCNC: 8.2 MG/DL (ref 8.6–10.4)
CHLORIDE BLD-SCNC: 100 MMOL/L (ref 98–107)
CHLORIDE BLD-SCNC: 101 MMOL/L (ref 98–107)
CHLORIDE BLD-SCNC: 102 MMOL/L (ref 98–107)
CHLORIDE BLD-SCNC: 98 MMOL/L (ref 98–107)
CO2: 12 MMOL/L (ref 20–31)
CO2: 13 MMOL/L (ref 20–31)
CO2: 14 MMOL/L (ref 20–31)
CO2: 16 MMOL/L (ref 20–31)
CREAT SERPL-MCNC: 0.5 MG/DL (ref 0.7–1.2)
CREAT SERPL-MCNC: 0.56 MG/DL (ref 0.7–1.2)
CREAT SERPL-MCNC: 0.56 MG/DL (ref 0.7–1.2)
CREAT SERPL-MCNC: 0.6 MG/DL (ref 0.7–1.2)
CULTURE: ABNORMAL
D-DIMER QUANTITATIVE: 3.08 MG/L FEU (ref 0–0.59)
GFR AFRICAN AMERICAN: >60 ML/MIN
GFR NON-AFRICAN AMERICAN: >60 ML/MIN
GFR SERPL CREATININE-BSD FRML MDRD: ABNORMAL ML/MIN/{1.73_M2}
GLUCOSE BLD-MCNC: 104 MG/DL (ref 75–110)
GLUCOSE BLD-MCNC: 117 MG/DL (ref 75–110)
GLUCOSE BLD-MCNC: 123 MG/DL (ref 75–110)
GLUCOSE BLD-MCNC: 124 MG/DL (ref 75–110)
GLUCOSE BLD-MCNC: 129 MG/DL (ref 75–110)
GLUCOSE BLD-MCNC: 132 MG/DL (ref 70–99)
GLUCOSE BLD-MCNC: 135 MG/DL (ref 70–99)
GLUCOSE BLD-MCNC: 148 MG/DL (ref 70–99)
GLUCOSE BLD-MCNC: 148 MG/DL (ref 75–110)
GLUCOSE BLD-MCNC: 156 MG/DL (ref 75–110)
GLUCOSE BLD-MCNC: 159 MG/DL (ref 75–110)
GLUCOSE BLD-MCNC: 171 MG/DL (ref 70–99)
GLUCOSE BLD-MCNC: 178 MG/DL (ref 75–110)
GLUCOSE BLD-MCNC: 188 MG/DL (ref 75–110)
Lab: ABNORMAL
MAGNESIUM: 1.8 MG/DL (ref 1.6–2.6)
MAGNESIUM: 1.9 MG/DL (ref 1.6–2.6)
PHOSPHORUS: 1.5 MG/DL (ref 2.5–4.5)
PHOSPHORUS: 1.8 MG/DL (ref 2.5–4.5)
PHOSPHORUS: 1.8 MG/DL (ref 2.5–4.5)
POTASSIUM SERPL-SCNC: 2.6 MMOL/L (ref 3.7–5.3)
POTASSIUM SERPL-SCNC: 2.9 MMOL/L (ref 3.7–5.3)
POTASSIUM SERPL-SCNC: 3.2 MMOL/L (ref 3.7–5.3)
POTASSIUM SERPL-SCNC: 3.9 MMOL/L (ref 3.7–5.3)
SODIUM BLD-SCNC: 133 MMOL/L (ref 135–144)
SODIUM BLD-SCNC: 135 MMOL/L (ref 135–144)
SPECIMEN DESCRIPTION: ABNORMAL

## 2021-08-03 PROCEDURE — 99232 SBSQ HOSP IP/OBS MODERATE 35: CPT | Performed by: INTERNAL MEDICINE

## 2021-08-03 PROCEDURE — 6370000000 HC RX 637 (ALT 250 FOR IP): Performed by: INTERNAL MEDICINE

## 2021-08-03 PROCEDURE — 2580000003 HC RX 258: Performed by: NURSE PRACTITIONER

## 2021-08-03 PROCEDURE — 6360000002 HC RX W HCPCS: Performed by: NURSE PRACTITIONER

## 2021-08-03 PROCEDURE — 93005 ELECTROCARDIOGRAM TRACING: CPT | Performed by: INTERNAL MEDICINE

## 2021-08-03 PROCEDURE — 86140 C-REACTIVE PROTEIN: CPT

## 2021-08-03 PROCEDURE — 2500000003 HC RX 250 WO HCPCS: Performed by: NURSE PRACTITIONER

## 2021-08-03 PROCEDURE — 80048 BASIC METABOLIC PNL TOTAL CA: CPT

## 2021-08-03 PROCEDURE — 82947 ASSAY GLUCOSE BLOOD QUANT: CPT

## 2021-08-03 PROCEDURE — 36415 COLL VENOUS BLD VENIPUNCTURE: CPT

## 2021-08-03 PROCEDURE — 6370000000 HC RX 637 (ALT 250 FOR IP): Performed by: NURSE PRACTITIONER

## 2021-08-03 PROCEDURE — 94761 N-INVAS EAR/PLS OXIMETRY MLT: CPT

## 2021-08-03 PROCEDURE — 6370000000 HC RX 637 (ALT 250 FOR IP): Performed by: FAMILY MEDICINE

## 2021-08-03 PROCEDURE — C9113 INJ PANTOPRAZOLE SODIUM, VIA: HCPCS | Performed by: INTERNAL MEDICINE

## 2021-08-03 PROCEDURE — 83735 ASSAY OF MAGNESIUM: CPT

## 2021-08-03 PROCEDURE — 2580000003 HC RX 258: Performed by: INTERNAL MEDICINE

## 2021-08-03 PROCEDURE — 84100 ASSAY OF PHOSPHORUS: CPT

## 2021-08-03 PROCEDURE — 99254 IP/OBS CNSLTJ NEW/EST MOD 60: CPT | Performed by: INTERNAL MEDICINE

## 2021-08-03 PROCEDURE — 2000000000 HC ICU R&B

## 2021-08-03 PROCEDURE — 2500000003 HC RX 250 WO HCPCS: Performed by: INTERNAL MEDICINE

## 2021-08-03 PROCEDURE — 71045 X-RAY EXAM CHEST 1 VIEW: CPT

## 2021-08-03 PROCEDURE — 85379 FIBRIN DEGRADATION QUANT: CPT

## 2021-08-03 PROCEDURE — 6360000002 HC RX W HCPCS: Performed by: INTERNAL MEDICINE

## 2021-08-03 RX ORDER — METOPROLOL TARTRATE 5 MG/5ML
5 INJECTION INTRAVENOUS ONCE
Status: COMPLETED | OUTPATIENT
Start: 2021-08-03 | End: 2021-08-03

## 2021-08-03 RX ORDER — SPIRONOLACTONE 25 MG/1
25 TABLET ORAL DAILY
Status: DISCONTINUED | OUTPATIENT
Start: 2021-08-03 | End: 2021-08-04

## 2021-08-03 RX ORDER — POTASSIUM CHLORIDE AND SODIUM CHLORIDE 900; 300 MG/100ML; MG/100ML
INJECTION, SOLUTION INTRAVENOUS CONTINUOUS
Status: DISCONTINUED | OUTPATIENT
Start: 2021-08-03 | End: 2021-08-06 | Stop reason: HOSPADM

## 2021-08-03 RX ORDER — SODIUM PHOSPHATE, MONOBASIC, MONOHYDRATE 276; 142 MG/ML; MG/ML
INJECTION, SOLUTION INTRAVENOUS
Status: DISPENSED
Start: 2021-08-03 | End: 2021-08-03

## 2021-08-03 RX ORDER — BACLOFEN 10 MG/1
10 TABLET ORAL ONCE
Status: COMPLETED | OUTPATIENT
Start: 2021-08-03 | End: 2021-08-03

## 2021-08-03 RX ADMIN — METOPROLOL TARTRATE 5 MG: 5 INJECTION INTRAVENOUS at 04:21

## 2021-08-03 RX ADMIN — Medication 2000 UNITS: at 08:35

## 2021-08-03 RX ADMIN — FLUCONAZOLE 200 MG: 2 INJECTION, SOLUTION INTRAVENOUS at 12:11

## 2021-08-03 RX ADMIN — CEFEPIME 2000 MG: 2 INJECTION, POWDER, FOR SOLUTION INTRAVENOUS at 20:48

## 2021-08-03 RX ADMIN — NYSTATIN 500000 UNITS: 100000 SUSPENSION ORAL at 08:35

## 2021-08-03 RX ADMIN — METOPROLOL TARTRATE 25 MG: 25 TABLET, FILM COATED ORAL at 20:49

## 2021-08-03 RX ADMIN — FAMOTIDINE 20 MG: 10 INJECTION, SOLUTION INTRAVENOUS at 08:35

## 2021-08-03 RX ADMIN — POTASSIUM BICARBONATE 40 MEQ: 782 TABLET, EFFERVESCENT ORAL at 22:08

## 2021-08-03 RX ADMIN — NYSTATIN 500000 UNITS: 100000 SUSPENSION ORAL at 21:05

## 2021-08-03 RX ADMIN — POTASSIUM BICARBONATE 40 MEQ: 782 TABLET, EFFERVESCENT ORAL at 06:21

## 2021-08-03 RX ADMIN — SODIUM CHLORIDE, PRESERVATIVE FREE 10 ML: 5 INJECTION INTRAVENOUS at 20:50

## 2021-08-03 RX ADMIN — POTASSIUM BICARBONATE 40 MEQ: 782 TABLET, EFFERVESCENT ORAL at 08:35

## 2021-08-03 RX ADMIN — ENOXAPARIN SODIUM 30 MG: 30 INJECTION SUBCUTANEOUS at 20:49

## 2021-08-03 RX ADMIN — SODIUM PHOSPHATE, MONOBASIC, MONOHYDRATE 15 MMOL: 276; 142 INJECTION, SOLUTION INTRAVENOUS at 02:46

## 2021-08-03 RX ADMIN — NYSTATIN 500000 UNITS: 100000 SUSPENSION ORAL at 12:13

## 2021-08-03 RX ADMIN — SPIRONOLACTONE 25 MG: 25 TABLET ORAL at 08:41

## 2021-08-03 RX ADMIN — SODIUM CHLORIDE, PRESERVATIVE FREE 10 ML: 5 INJECTION INTRAVENOUS at 08:35

## 2021-08-03 RX ADMIN — PANTOPRAZOLE SODIUM 40 MG: 40 INJECTION, POWDER, FOR SOLUTION INTRAVENOUS at 20:48

## 2021-08-03 RX ADMIN — POTASSIUM CHLORIDE 10 MEQ: 7.46 INJECTION, SOLUTION INTRAVENOUS at 00:22

## 2021-08-03 RX ADMIN — POTASSIUM CHLORIDE AND SODIUM CHLORIDE: 900; 300 INJECTION, SOLUTION INTRAVENOUS at 20:48

## 2021-08-03 RX ADMIN — POTASSIUM CHLORIDE 10 MEQ: 7.46 INJECTION, SOLUTION INTRAVENOUS at 23:34

## 2021-08-03 RX ADMIN — GLIMEPIRIDE 3 MG: 1 TABLET ORAL at 08:35

## 2021-08-03 RX ADMIN — INSULIN LISPRO 3 UNITS: 100 INJECTION, SOLUTION INTRAVENOUS; SUBCUTANEOUS at 08:36

## 2021-08-03 RX ADMIN — ENOXAPARIN SODIUM 30 MG: 30 INJECTION SUBCUTANEOUS at 08:35

## 2021-08-03 RX ADMIN — CEFEPIME 2000 MG: 2 INJECTION, POWDER, FOR SOLUTION INTRAVENOUS at 08:36

## 2021-08-03 RX ADMIN — POTASSIUM CHLORIDE 10 MEQ: 7.46 INJECTION, SOLUTION INTRAVENOUS at 22:57

## 2021-08-03 RX ADMIN — INSULIN LISPRO 3 UNITS: 100 INJECTION, SOLUTION INTRAVENOUS; SUBCUTANEOUS at 12:10

## 2021-08-03 RX ADMIN — BACLOFEN 10 MG: 10 TABLET ORAL at 12:10

## 2021-08-03 RX ADMIN — INSULIN GLARGINE 20 UNITS: 100 INJECTION, SOLUTION SUBCUTANEOUS at 20:49

## 2021-08-03 RX ADMIN — POTASSIUM BICARBONATE 40 MEQ: 782 TABLET, EFFERVESCENT ORAL at 12:11

## 2021-08-03 RX ADMIN — NYSTATIN 500000 UNITS: 100000 SUSPENSION ORAL at 18:12

## 2021-08-03 RX ADMIN — PANTOPRAZOLE SODIUM 40 MG: 40 INJECTION, POWDER, FOR SOLUTION INTRAVENOUS at 08:35

## 2021-08-03 RX ADMIN — FAMOTIDINE 20 MG: 10 INJECTION, SOLUTION INTRAVENOUS at 20:49

## 2021-08-03 RX ADMIN — METOPROLOL TARTRATE 25 MG: 25 TABLET, FILM COATED ORAL at 14:50

## 2021-08-03 RX ADMIN — POTASSIUM BICARBONATE 40 MEQ: 782 TABLET, EFFERVESCENT ORAL at 20:49

## 2021-08-03 RX ADMIN — Medication 1.3 UNITS/HR: at 00:10

## 2021-08-03 RX ADMIN — POTASSIUM CHLORIDE AND SODIUM CHLORIDE: 900; 300 INJECTION, SOLUTION INTRAVENOUS at 06:20

## 2021-08-03 RX ADMIN — METOPROLOL TARTRATE 5 MG: 5 INJECTION INTRAVENOUS at 10:52

## 2021-08-03 ASSESSMENT — PAIN SCALES - GENERAL
PAINLEVEL_OUTOF10: 0
PAINLEVEL_OUTOF10: 0

## 2021-08-03 NOTE — PROGRESS NOTES
guaiFENesin-dextromethorphan, acetaminophen **OR** acetaminophen, albuterol sulfate HFA    Objective:  Labs:  CBC with Differential:    Lab Results   Component Value Date    WBC 9.0 08/02/2021    RBC 5.40 08/02/2021    HGB 16.3 08/02/2021    HCT 46.1 08/02/2021     08/02/2021    MCV 85.4 08/02/2021    MCH 30.2 08/02/2021    MCHC 35.4 08/02/2021    RDW 12.6 08/02/2021    LYMPHOPCT 7 08/02/2021    MONOPCT 4 08/02/2021    BASOPCT 0 08/02/2021    MONOSABS 0.38 08/02/2021    LYMPHSABS 0.66 08/02/2021    EOSABS <0.03 08/02/2021    BASOSABS <0.03 08/02/2021    DIFFTYPE NOT REPORTED 08/02/2021     BMP:    Lab Results   Component Value Date     08/03/2021    K 3.2 08/03/2021    CL 98 08/03/2021    CO2 14 08/03/2021    BUN 6 08/03/2021    LABALBU 2.7 08/02/2021    CREATININE 0.56 08/03/2021    CALCIUM 8.2 08/03/2021    GFRAA >60 08/03/2021    LABGLOM >60 08/03/2021    GLUCOSE 171 08/03/2021           Physical Exam:  Vitals: /69   Pulse 94   Temp 98.8 °F (37.1 °C) (Oral)   Resp 27   Ht 6' (1.829 m)   Wt 181 lb 6.4 oz (82.3 kg)   SpO2 93%   BMI 24.60 kg/m²   24 hour intake/output:    Intake/Output Summary (Last 24 hours) at 8/3/2021 1155  Last data filed at 8/3/2021 0915  Gross per 24 hour   Intake 4882 ml   Output 3700 ml   Net 1182 ml     Last 3 weights: Wt Readings from Last 3 Encounters:   08/02/21 181 lb 6.4 oz (82.3 kg)   04/04/17 241 lb 9.6 oz (109.6 kg)   08/25/16 268 lb 3.2 oz (121.7 kg)     HEENT: Normocephalic and Atraumatic  Neck: Supple, No Masses, Tenderness, Nodularity and No Lymphadenopathy  Chest/Lungs: Distant Breath Sounds  Cardiac: Regular Rate and Rhythm---tachycardia  GI/Abdomen:  Bowel Sounds Present and Soft, Non-tender, without Guarding or Rebound Tenderness  : Not examined  EXT/Skin: No Edema, No Cyanosis and No Clubbing  Neuro: alert--generalized weakness----- and No Localizing Signs/Symptoms      Assessment:    Principal Problem:    DKA, type 2, not at goal [facility])  Active Problems:    Diabetic ketoacidosis without coma associated with diabetes mellitus due to underlying condition ([address]    Pneumonia due to COVID-19 virus    Hypokalemia  Resolved Problems:    * No resolved hospital problems.  *    CONCEPCION MCKEON     32  WM    [s Oksana, IM;  DC Cardiology---[facility]]  FULL CODE     LOVENOX      COVID--19--[+]--7.31.2021---no vaccine    SUPPLEMENTAL OXYGEN    Anti-infectives:   [Rocephin IV, Zithromax IV, Zosyn IV]---dcd    Cefepime  IV, nystatin s/s, Diflucan IV     Event of multifocal tachycardia---8.3.2021--now SR  Hypokalemia---8.3.2021  DKA---7.31.2021            Elevated beta-hydroxybuterate---5.13  Covid-19---pneumonia---7.31.2021---source likely Kolbs co-workers with Covid-19----onset 7.29.2021           Elevated dehydration           CTA chest---pulmonary---7.31.2021---no PE--bilateral patchy pneumonia with area of confluent                                      consolidation at the anterolateral RLLL--moderate-to-severe mural thickening                                      of nearly the entire visualized esophagus suggestive of marked esophagitis----                                      old granulomatous disease---liver may be slightly enlarged             CXR---7.31.2021---NACs  Sinus tachycardia----rhythm strip---sinus tachycardia---128  Dehydration--7.3.1.2021    Pseudomonas aeruginosa--[+]--BLOOD CULTURE              2D ECHO---8.2.2021---NLVSF--NRVSF---normal appearing valves and chambers---normal                                   AR 3.4 cm--IVC not visualized--no obvious vegetations--no DD---LVEF ~ 60%       GERD---esophagitis  Dysphagia---possible due to GERD  Hyponatremia     Cerebral palsy          Left-sided hemiparesis          Gait-balance instability--falls  Hypertension   Hyperlipidemia  Headaches         Tension         Migraine  Elevated HgbA1c  PMH:  dizziness, old CHI  PSH:   none listed    Allergies:        NKDA  Intolerances:  Imitrex--nausea---vomiting Plan:  1. Covid 19 PNA---on Cefepime--Diflucan--nystatin s/s---respiratory regimen---supplemental oxygen to keep O2 sat 90-95%  2. Tachycardia--Cardiology--BB----correction of potassium---treating PNA  3. Hypokalemia----potassium replacement--started on aldactone  4. Pseudomonas aeruginosa bacteremia----Cefepime---no evidence of endocarditis  5. DKA---resolved ---> Lantus---Amaryl [Glucotrol subsituted]  6.    See orders     Electronically signed by Anabela Ferris on 8/3/2021 at 11:55 AM    Hospitalist

## 2021-08-03 NOTE — CONSULTS
[facility]   Consult Note         Today's Date: 8/3/2021  Patient Name: Jorge Cruz  Date of admission: 7/31/2021  9:31 PM  Patient's age: 32 y.o., 1995  Admission Dx: DKA, type 2, not at goal [facility]) [E11.10]    Reason for Consult:  Cardiac evaluation    Requesting Physician: Gavino Sheridan MD    REASON FOR CONSULT:  COVID +, Tachycardia, DKA, PNA    History Obtained From:  Patient, chart, staff, records    HISTORY OF PRESENT ILLNESS:      The patient is a 32 y.o. male who is admitted to the hospital for SOB, tachycardia and active COVID infection. Has not taken diabetes meds and is in DKA. Cardiology consulted to help with tachycardia. Past Medical History:   has a past medical history of Balance problem, CP (cerebral palsy) ([facility] [address] Dizziness, DM (diabetes mellitus) ([facility] Elevated hemoglobin A1c, FH: migraine headache, H/O fall, Head injury, Headache(784.0), HTN (hypertension), Left hemiparesis ([address] Migraine, Old head injury, and Tension headache. Past Surgical History:   has no past surgical history on file. Home Medications:    Prior to Admission medications    Medication Sig Start Date End Date Taking?  Authorizing Provider   butalbital-acetaminophen-caffeine (FIORICET, ESGIC) -40 MG per tablet TAKE ONE TO TWO TABLETS BY MOUTH IN THE EVENING AND AT BEDTIME AS NEEDED FOR TENSION HEADACHE 11/6/17  Yes Gabrielle Villar MD   naproxen (NAPROSYN) 500 MG tablet TAKE ONE TABLET BY MOUTH TWICE DAILY WITH MEALS   AS  NEEDED 8/25/16  Yes Gabrielle Villar MD   ranitidine (ZANTAC) 300 MG tablet Take 1 tablet by mouth nightly 8/25/16  Yes Gabrielle Villar MD   rizatriptan (MAXALT-MLT) 10 MG disintegrating tablet ONE TABLET AT ONSET OF MIGRAINE HEADACHE, MAY REPEAT IN 2 HOURS PRN, MAX DOSE IN 24 HOURS IS 2 TABLETS. 8/25/16  Yes Gabrielle Villar MD   topiramate (TOPAMAX) 100 MG tablet One  Tablet  Twice  daily 8/25/16  Yes Gabrielle Villar MD   metFORMIN (GLUCOPHAGE) 1000 MG tablet TAKE ONE TABLET BY MOUTH TWICE DAILY WITH MEALS 7/11/16  Yes Prashant Nelson MD   glimepiride (AMARYL) 2 MG tablet Take 1 tablet by mouth every morning 1/4/16  Yes Prashant Nelson MD   acetaminophen (TYLENOL) 500 MG tablet Take 1,000 mg by mouth every 4 hours as needed for Pain. Yes Historical Provider, MD       sodium phosphates, , ,     glimepiride, 3 mg, Oral, Daily with breakfast    spironolactone, 25 mg, Oral, Daily    dextrose, , ,     cefepime, 2,000 mg, Intravenous, Q12H    pantoprazole, 40 mg, Intravenous, BID **AND** sodium chloride (PF), 10 mL, Intravenous, Daily    famotidine (PEPCID) injection, 20 mg, Intravenous, BID    nystatin, 5 mL, Oral, 4x Daily    fluconazole, 200 mg, Intravenous, Q24H    metoprolol, 5 mg, Intravenous, Q6H    potassium bicarb-citric acid, 40 mEq, Oral, BID    Vitamin D, 2,000 Units, Oral, Daily    enoxaparin, 30 mg, Subcutaneous, BID    insulin glargine, 20 Units, Subcutaneous, Nightly    insulin lispro, 0-18 Units, Subcutaneous, TID WC    insulin lispro, 0-9 Units, Subcutaneous, Nightly      Allergies:  Imitrex [sumatriptan]    Social History:   reports that he has never smoked. He has never used smokeless tobacco. He reports that he does not drink alcohol and does not use drugs. Family History: family history is not on file. No h/o sudden cardiac death. REVIEW OF SYSTEMS:    · Constitutional: there has been no unanticipated weight loss. There's been No change in energy level, No change in activity level. · Eyes: No visual changes or diplopia. No scleral icterus. · ENT: No Headaches, hearing loss or vertigo. No mouth sores or sore throat. · Cardiovascular: per HPI  · Respiratory: per HPI  · Gastrointestinal: No abdominal pain, appetite loss, blood in stools. No change in bowel or bladder habits. · Genitourinary: No dysuria, trouble voiding, or hematuria.   · Musculoskeletal:  No gait disturbance, No weakness or joint complaints. · Integumentary: No rash or pruritis. · Neurological: No headache, diplopia, change in muscle strength, numbness or tingling. No change in gait, balance, coordination, mood, affect, memory, mentation, behavior. · Psychiatric: No anxiety, or depression. · Endocrine: No temperature intolerance. No excessive thirst, fluid intake, or urination. No tremor. · Hematologic/Lymphatic: No abnormal bruising or bleeding, blood clots or swollen lymph nodes. · Allergic/Immunologic: No nasal congestion or hives. PHYSICAL EXAM:      /68   Pulse 98   Temp 98.8 °F (37.1 °C) (Oral)   Resp 26   Ht 6' (1.829 m)   Wt 181 lb 6.4 oz (82.3 kg)   SpO2 94%   BMI 24.60 kg/m²    · Deferred due to COVID+. · See Physical from primary care team        EKG:    Date: 08/03/21  Reading: No acute ischemia      LAST ECHO:  Date:  Findings Summary:      LAST Stress Test:   Date of last ST:  Major Findings:    LAST Cardiac Angiography:.  Date:  Findings:      Labs:     CBC:   Recent Labs     08/01/21  0211 08/02/21  0707   WBC 12.6* 9.0   HGB 18.0* 16.3   HCT 51.8* 46.1    164     BMP:   Recent Labs     08/03/21  0409 08/03/21  1020    133*   K 2.6* 3.2*   CO2 13* 14*   BUN 5* 6   CREATININE 0.56* 0.56*   LABGLOM >60 >60   GLUCOSE 148* 171*     BNP: No results for input(s): BNP in the last 72 hours. PT/INR:   Recent Labs     08/01/21  0211   PROTIME 14.6*   INR 1.2     APTT:No results for input(s): APTT in the last 72 hours. CARDIAC ENZYMES:No results for input(s): CKTOTAL, CKMB, CKMBINDEX, TROPONINI in the last 72 hours.   FASTING LIPID PANEL:No results found for: HDL, LDLDIRECT, LDLCALC, TRIG  LIVER PROFILE:  Recent Labs     07/31/21  2150 08/02/21  1819   AST 18  --    ALT 21  --    LABALBU 4.4 2.7*     Troponins: Invalid input(s): TROPONIN     Other Current Problems  Patient Active Problem List   Diagnosis    Migraines    Cerebral palsy ([address]    Left-sided weakness    Dizziness    Balance problem    Old head injury    DM [facility]diabetes mellitus) ([facility]    Tension headache    H/O fall    HTN (hypertension)    FH: migraine headache    Head injury    Mixed hyperlipidemia    DKA, type 2, not at goal [facility])    Diabetic ketoacidosis without coma associated with diabetes mellitus due to underlying condition ([facility]    Pneumonia due to COVID-19 virus    Hypokalemia           IMPRESSION & Recommendations:      Tachycardia- multifactorial- dehydration from DKA, DKA, PNA, COVID+. Treat underlying conditions  DKA- per primary team, rehydrate  Dehydration- multifactorial- take DM meds, and rehydrate  No ECHO at this time to limit exposure to covid, it's not necessary at this time to adequately treat patient. Discussed with patient, family, and Nurse. Electronically signed by Mary Beth Cornell DO on 8/3/2021 at 12:14 PM    Mary Beth Cornell, [id number] [facility]  [facility]  [phone]

## 2021-08-03 NOTE — PLAN OF CARE
Problem: Airway Clearance - Ineffective  Goal: Achieve or maintain patent airway  Outcome: Ongoing     Problem: Gas Exchange - Impaired  Goal: Absence of hypoxia  Outcome: Ongoing  Goal: Promote optimal lung function  Outcome: Ongoing     Problem: Breathing Pattern - Ineffective  Goal: Ability to achieve and maintain a regular respiratory rate  Outcome: Ongoing     Problem: Body Temperature -  Risk of, Imbalanced  Goal: Ability to maintain a body temperature within defined limits  Outcome: Ongoing  Goal: Will regain or maintain usual level of consciousness  Outcome: Ongoing  Goal: Complications related to the disease process, condition or treatment will be avoided or minimized  Outcome: Ongoing     Problem: Isolation Precautions - Risk of Spread of Infection  Goal: Prevent transmission of infection  Outcome: Ongoing     Problem: Nutrition Deficits  Goal: Optimize nutritional status  Outcome: Ongoing     Problem: Risk for Fluid Volume Deficit  Goal: Maintain normal heart rhythm  Outcome: Ongoing  Goal: Maintain absence of muscle cramping  Outcome: Ongoing  Goal: Maintain normal serum potassium, sodium, calcium, phosphorus, and pH  Outcome: Ongoing     Problem: Fatigue  Goal: Verbalize increase energy and improved vitality  Outcome: Ongoing     Problem: Falls - Risk of:  Goal: Will remain free from falls  Description: Will remain free from falls  Outcome: Ongoing  Goal: Absence of physical injury  Description: Absence of physical injury  Outcome: Ongoing     Problem: Pain:  Description: Pain management should include both nonpharmacologic and pharmacologic interventions.   Goal: Pain level will decrease  Description: Pain level will decrease  Outcome: Ongoing  Goal: Control of acute pain  Description: Control of acute pain  Outcome: Ongoing  Goal: Control of chronic pain  Description: Control of chronic pain  Outcome: Ongoing     Problem: Skin Integrity:  Goal: Will show no infection signs and symptoms  Description: Will show no infection signs and symptoms  Outcome: Ongoing  Goal: Absence of new skin breakdown  Description: Absence of new skin breakdown  Outcome: Ongoing     Problem: Discharge Planning:  Goal: Discharged to appropriate level of care  Description: Discharged to appropriate level of care  Outcome: Ongoing     Problem: Serum Glucose Level - Abnormal:  Goal: Ability to maintain appropriate glucose levels has stabilized  Description: Ability to maintain appropriate glucose levels has stabilized  Outcome: Ongoing

## 2021-08-03 NOTE — PLAN OF CARE
Problem: Airway Clearance - Ineffective  Goal: Achieve or maintain patent airway  Outcome: Ongoing     Problem: Gas Exchange - Impaired  Goal: Absence of hypoxia  Outcome: Ongoing  Goal: Promote optimal lung function  Outcome: Ongoing     Problem: Breathing Pattern - Ineffective  Goal: Ability to achieve and maintain a regular respiratory rate  Outcome: Ongoing     Problem:  Body Temperature -  Risk of, Imbalanced  Goal: Ability to maintain a body temperature within defined limits  Outcome: Ongoing  Goal: Will regain or maintain usual level of consciousness  Outcome: Ongoing  Goal: Complications related to the disease process, condition or treatment will be avoided or minimized  Outcome: Ongoing     Problem: Isolation Precautions - Risk of Spread of Infection  Goal: Prevent transmission of infection  Outcome: Ongoing     Problem: Nutrition Deficits  Goal: Optimize nutritional status  Outcome: Ongoing     Problem: Risk for Fluid Volume Deficit  Goal: Maintain normal heart rhythm  Outcome: Ongoing  Goal: Maintain absence of muscle cramping  Outcome: Ongoing  Goal: Maintain normal serum potassium, sodium, calcium, phosphorus, and pH  Outcome: Ongoing     Problem: Fatigue  Goal: Verbalize increase energy and improved vitality  Outcome: Ongoing     Problem: Falls - Risk of:  Goal: Will remain free from falls  Description: Will remain free from falls  Outcome: Ongoing  Goal: Absence of physical injury  Description: Absence of physical injury  Outcome: Ongoing     Problem: Pain:  Goal: Pain level will decrease  Description: Pain level will decrease  Outcome: Ongoing  Goal: Control of acute pain  Description: Control of acute pain  Outcome: Ongoing  Goal: Control of chronic pain  Description: Control of chronic pain  Outcome: Ongoing     Problem: Skin Integrity:  Goal: Will show no infection signs and symptoms  Description: Will show no infection signs and symptoms  Outcome: Ongoing  Goal: Absence of new skin breakdown  Description: Absence of new skin breakdown  Outcome: Ongoing     Problem: Discharge Planning:  Goal: Discharged to appropriate level of care  Description: Discharged to appropriate level of care  Outcome: Ongoing     Problem: Serum Glucose Level - Abnormal:  Goal: Ability to maintain appropriate glucose levels has stabilized  Description: Ability to maintain appropriate glucose levels has stabilized  Outcome: Ongoing

## 2021-08-03 NOTE — PROGRESS NOTES
SW attempted to contact pt to complete assessment. No answer.        Electronically signed by ИРИНА Higginbotham on 8/3/2021 at 9:16 AM

## 2021-08-03 NOTE — PROGRESS NOTES
SW attempted to contact patient. No answer. SW referral regarding patients insurance. SW contacted Medicaid to see if active. Patient does not have Medicaid and currently has no insurance. Nurse and discharge planner notified. Medicaid application provided.        Electronically signed by ИРИНА Benz on 8/3/2021 at 11:04 AM

## 2021-08-04 ENCOUNTER — APPOINTMENT (OUTPATIENT)
Dept: GENERAL RADIOLOGY | Age: 26
DRG: 177 | End: 2021-08-04

## 2021-08-04 PROBLEM — E11.10 DKA, TYPE 2, NOT AT GOAL (HCC): Status: RESOLVED | Noted: 2021-07-31 | Resolved: 2021-08-04

## 2021-08-04 PROBLEM — B96.5 PSEUDOMONAL BACTEREMIA: Status: ACTIVE | Noted: 2021-08-02

## 2021-08-04 PROBLEM — R78.81 PSEUDOMONAL BACTEREMIA: Status: ACTIVE | Noted: 2021-08-02

## 2021-08-04 LAB
ABSOLUTE EOS #: <0.03 K/UL (ref 0–0.44)
ABSOLUTE IMMATURE GRANULOCYTE: 0.07 K/UL (ref 0–0.3)
ABSOLUTE LYMPH #: 1.95 K/UL (ref 1.1–3.7)
ABSOLUTE MONO #: 0.67 K/UL (ref 0.1–1.2)
ALBUMIN SERPL-MCNC: 2.3 G/DL (ref 3.5–5.2)
ALBUMIN/GLOBULIN RATIO: 0.8 (ref 1–2.5)
ALP BLD-CCNC: 50 U/L (ref 40–129)
ALT SERPL-CCNC: 9 U/L (ref 5–41)
ANION GAP SERPL CALCULATED.3IONS-SCNC: 13 MMOL/L (ref 9–17)
AST SERPL-CCNC: 13 U/L
BASOPHILS # BLD: 0 % (ref 0–2)
BASOPHILS ABSOLUTE: 0.03 K/UL (ref 0–0.2)
BILIRUB SERPL-MCNC: 0.39 MG/DL (ref 0.3–1.2)
BILIRUBIN DIRECT: 0.12 MG/DL
BILIRUBIN, INDIRECT: 0.27 MG/DL (ref 0–1)
BUN BLDV-MCNC: 6 MG/DL (ref 6–20)
CALCIUM SERPL-MCNC: 8.1 MG/DL (ref 8.6–10.4)
CHLORIDE BLD-SCNC: 100 MMOL/L (ref 98–107)
CO2: 18 MMOL/L (ref 20–31)
CREAT SERPL-MCNC: 0.44 MG/DL (ref 0.7–1.2)
DIFFERENTIAL TYPE: ABNORMAL
EKG ATRIAL RATE: 104 BPM
EKG P AXIS: 63 DEGREES
EKG P-R INTERVAL: 148 MS
EKG Q-T INTERVAL: 350 MS
EKG QRS DURATION: 72 MS
EKG QTC CALCULATION (BAZETT): 460 MS
EKG R AXIS: 63 DEGREES
EKG T AXIS: 38 DEGREES
EKG VENTRICULAR RATE: 104 BPM
EOSINOPHILS RELATIVE PERCENT: 0 % (ref 1–4)
GFR AFRICAN AMERICAN: >60 ML/MIN
GFR NON-AFRICAN AMERICAN: >60 ML/MIN
GFR SERPL CREATININE-BSD FRML MDRD: ABNORMAL ML/MIN/{1.73_M2}
GFR SERPL CREATININE-BSD FRML MDRD: ABNORMAL ML/MIN/{1.73_M2}
GLUCOSE BLD-MCNC: 124 MG/DL (ref 75–110)
GLUCOSE BLD-MCNC: 127 MG/DL (ref 75–110)
GLUCOSE BLD-MCNC: 130 MG/DL (ref 75–110)
GLUCOSE BLD-MCNC: 131 MG/DL (ref 70–99)
GLUCOSE BLD-MCNC: 169 MG/DL (ref 75–110)
HCT VFR BLD CALC: 38.3 % (ref 40.7–50.3)
HEMOGLOBIN: 13.9 G/DL (ref 13–17)
IMMATURE GRANULOCYTES: 1 %
LYMPHOCYTES # BLD: 29 % (ref 24–43)
MCH RBC QN AUTO: 30.8 PG (ref 25.2–33.5)
MCHC RBC AUTO-ENTMCNC: 36.3 G/DL (ref 25.2–33.5)
MCV RBC AUTO: 84.7 FL (ref 82.6–102.9)
MONOCYTES # BLD: 10 % (ref 3–12)
NRBC AUTOMATED: 0 PER 100 WBC
PDW BLD-RTO: 12.9 % (ref 11.8–14.4)
PHOSPHORUS: 1.4 MG/DL (ref 2.5–4.5)
PLATELET # BLD: ABNORMAL K/UL (ref 138–453)
PLATELET ESTIMATE: ABNORMAL
PLATELET, FLUORESCENCE: 163 K/UL (ref 138–453)
PLATELET, IMMATURE FRACTION: 4 % (ref 1.1–10.3)
PMV BLD AUTO: ABNORMAL FL (ref 8.1–13.5)
POTASSIUM SERPL-SCNC: 3.1 MMOL/L (ref 3.7–5.3)
POTASSIUM SERPL-SCNC: 3.1 MMOL/L (ref 3.7–5.3)
RBC # BLD: 4.52 M/UL (ref 4.21–5.77)
RBC # BLD: ABNORMAL 10*6/UL
SEG NEUTROPHILS: 60 % (ref 36–65)
SEGMENTED NEUTROPHILS ABSOLUTE COUNT: 4.02 K/UL (ref 1.5–8.1)
SODIUM BLD-SCNC: 131 MMOL/L (ref 135–144)
TOTAL PROTEIN: 5.2 G/DL (ref 6.4–8.3)
WBC # BLD: 6.7 K/UL (ref 3.5–11.3)
WBC # BLD: ABNORMAL 10*3/UL

## 2021-08-04 PROCEDURE — C9113 INJ PANTOPRAZOLE SODIUM, VIA: HCPCS | Performed by: INTERNAL MEDICINE

## 2021-08-04 PROCEDURE — 82248 BILIRUBIN DIRECT: CPT

## 2021-08-04 PROCEDURE — 6370000000 HC RX 637 (ALT 250 FOR IP): Performed by: FAMILY MEDICINE

## 2021-08-04 PROCEDURE — 6360000002 HC RX W HCPCS: Performed by: NURSE PRACTITIONER

## 2021-08-04 PROCEDURE — 80053 COMPREHEN METABOLIC PANEL: CPT

## 2021-08-04 PROCEDURE — 36415 COLL VENOUS BLD VENIPUNCTURE: CPT

## 2021-08-04 PROCEDURE — 85025 COMPLETE CBC W/AUTO DIFF WBC: CPT

## 2021-08-04 PROCEDURE — 6370000000 HC RX 637 (ALT 250 FOR IP): Performed by: INTERNAL MEDICINE

## 2021-08-04 PROCEDURE — 6370000000 HC RX 637 (ALT 250 FOR IP): Performed by: NURSE PRACTITIONER

## 2021-08-04 PROCEDURE — 71045 X-RAY EXAM CHEST 1 VIEW: CPT

## 2021-08-04 PROCEDURE — APPNB15 APP NON BILLABLE TIME 0-15 MINS: Performed by: NURSE PRACTITIONER

## 2021-08-04 PROCEDURE — 85055 RETICULATED PLATELET ASSAY: CPT

## 2021-08-04 PROCEDURE — 82947 ASSAY GLUCOSE BLOOD QUANT: CPT

## 2021-08-04 PROCEDURE — 99232 SBSQ HOSP IP/OBS MODERATE 35: CPT | Performed by: INTERNAL MEDICINE

## 2021-08-04 PROCEDURE — 84100 ASSAY OF PHOSPHORUS: CPT

## 2021-08-04 PROCEDURE — 2500000003 HC RX 250 WO HCPCS: Performed by: INTERNAL MEDICINE

## 2021-08-04 PROCEDURE — 6370000000 HC RX 637 (ALT 250 FOR IP)

## 2021-08-04 PROCEDURE — 6360000002 HC RX W HCPCS: Performed by: INTERNAL MEDICINE

## 2021-08-04 PROCEDURE — 2500000003 HC RX 250 WO HCPCS

## 2021-08-04 PROCEDURE — 2580000003 HC RX 258: Performed by: INTERNAL MEDICINE

## 2021-08-04 PROCEDURE — 2000000000 HC ICU R&B

## 2021-08-04 PROCEDURE — 84132 ASSAY OF SERUM POTASSIUM: CPT

## 2021-08-04 RX ORDER — SPIRONOLACTONE 25 MG/1
50 TABLET ORAL DAILY
Status: DISCONTINUED | OUTPATIENT
Start: 2021-08-04 | End: 2021-08-06 | Stop reason: HOSPADM

## 2021-08-04 RX ORDER — SPIRONOLACTONE 25 MG/1
50 TABLET ORAL DAILY
Status: DISCONTINUED | OUTPATIENT
Start: 2021-08-05 | End: 2021-08-04

## 2021-08-04 RX ORDER — LEVALBUTEROL TARTRATE 45 UG/1
2 AEROSOL, METERED ORAL EVERY 6 HOURS PRN
Status: DISCONTINUED | OUTPATIENT
Start: 2021-08-04 | End: 2021-08-04 | Stop reason: RX

## 2021-08-04 RX ORDER — SODIUM PHOSPHATE, MONOBASIC, MONOHYDRATE 276; 142 MG/ML; MG/ML
INJECTION, SOLUTION INTRAVENOUS
Status: COMPLETED
Start: 2021-08-04 | End: 2021-08-04

## 2021-08-04 RX ORDER — SPIRONOLACTONE 25 MG/1
TABLET ORAL
Status: COMPLETED
Start: 2021-08-04 | End: 2021-08-04

## 2021-08-04 RX ADMIN — SPIRONOLACTONE 50 MG: 25 TABLET ORAL at 10:51

## 2021-08-04 RX ADMIN — SODIUM PHOSPHATE, MONOBASIC, MONOHYDRATE 15 MMOL: 276; 142 INJECTION, SOLUTION INTRAVENOUS at 01:12

## 2021-08-04 RX ADMIN — POTASSIUM BICARBONATE 40 MEQ: 782 TABLET, EFFERVESCENT ORAL at 20:59

## 2021-08-04 RX ADMIN — POTASSIUM CHLORIDE AND SODIUM CHLORIDE: 900; 300 INJECTION, SOLUTION INTRAVENOUS at 10:51

## 2021-08-04 RX ADMIN — INSULIN LISPRO 3 UNITS: 100 INJECTION, SOLUTION INTRAVENOUS; SUBCUTANEOUS at 13:34

## 2021-08-04 RX ADMIN — PANTOPRAZOLE SODIUM 40 MG: 40 INJECTION, POWDER, FOR SOLUTION INTRAVENOUS at 09:18

## 2021-08-04 RX ADMIN — GLIMEPIRIDE 3 MG: 1 TABLET ORAL at 09:19

## 2021-08-04 RX ADMIN — NYSTATIN 500000 UNITS: 100000 SUSPENSION ORAL at 21:19

## 2021-08-04 RX ADMIN — NYSTATIN 500000 UNITS: 100000 SUSPENSION ORAL at 17:41

## 2021-08-04 RX ADMIN — FAMOTIDINE 20 MG: 10 INJECTION, SOLUTION INTRAVENOUS at 20:45

## 2021-08-04 RX ADMIN — NYSTATIN 500000 UNITS: 100000 SUSPENSION ORAL at 13:32

## 2021-08-04 RX ADMIN — ENOXAPARIN SODIUM 30 MG: 30 INJECTION SUBCUTANEOUS at 09:18

## 2021-08-04 RX ADMIN — FLUCONAZOLE 200 MG: 2 INJECTION, SOLUTION INTRAVENOUS at 13:30

## 2021-08-04 RX ADMIN — POTASSIUM BICARBONATE 60 MEQ: 782 TABLET, EFFERVESCENT ORAL at 09:15

## 2021-08-04 RX ADMIN — CEFEPIME 2000 MG: 2 INJECTION, POWDER, FOR SOLUTION INTRAVENOUS at 20:58

## 2021-08-04 RX ADMIN — POTASSIUM CHLORIDE AND SODIUM CHLORIDE: 900; 300 INJECTION, SOLUTION INTRAVENOUS at 20:45

## 2021-08-04 RX ADMIN — SODIUM CHLORIDE, PRESERVATIVE FREE 10 ML: 5 INJECTION INTRAVENOUS at 09:21

## 2021-08-04 RX ADMIN — Medication 2000 UNITS: at 09:19

## 2021-08-04 RX ADMIN — SODIUM PHOSPHATE, MONOBASIC, MONOHYDRATE 20 MMOL: 276; 142 INJECTION, SOLUTION INTRAVENOUS at 10:51

## 2021-08-04 RX ADMIN — NYSTATIN 500000 UNITS: 100000 SUSPENSION ORAL at 09:27

## 2021-08-04 RX ADMIN — POTASSIUM CHLORIDE 10 MEQ: 7.46 INJECTION, SOLUTION INTRAVENOUS at 00:52

## 2021-08-04 RX ADMIN — CEFEPIME 2000 MG: 2 INJECTION, POWDER, FOR SOLUTION INTRAVENOUS at 09:16

## 2021-08-04 RX ADMIN — POTASSIUM BICARBONATE 60 MEQ: 782 TABLET, EFFERVESCENT ORAL at 20:45

## 2021-08-04 RX ADMIN — FAMOTIDINE 20 MG: 10 INJECTION, SOLUTION INTRAVENOUS at 09:18

## 2021-08-04 RX ADMIN — METOPROLOL TARTRATE 25 MG: 25 TABLET, FILM COATED ORAL at 09:23

## 2021-08-04 RX ADMIN — PANTOPRAZOLE SODIUM 40 MG: 40 INJECTION, POWDER, FOR SOLUTION INTRAVENOUS at 20:45

## 2021-08-04 RX ADMIN — POTASSIUM CHLORIDE 10 MEQ: 7.46 INJECTION, SOLUTION INTRAVENOUS at 02:04

## 2021-08-04 RX ADMIN — ENOXAPARIN SODIUM 30 MG: 30 INJECTION SUBCUTANEOUS at 20:45

## 2021-08-04 RX ADMIN — METOPROLOL TARTRATE 25 MG: 25 TABLET, FILM COATED ORAL at 20:45

## 2021-08-04 RX ADMIN — INSULIN GLARGINE 20 UNITS: 100 INJECTION, SOLUTION SUBCUTANEOUS at 20:46

## 2021-08-04 ASSESSMENT — PAIN SCALES - GENERAL
PAINLEVEL_OUTOF10: 0

## 2021-08-04 NOTE — PROGRESS NOTES
Hospitalist Progress Note    Patient:  Mattie Scales     YOB: 1995    MRN: [id number]   Admit date: 7/31/2021     Acct: [de-identified]     PCP: No primary care provider on file. CC--Interval History:   Covid-19 pneumonia----breathing better on Cefepime--Diflucan    Not event of tachycardia    Potassium 3.1 improved ----> potassium replacement----aldactone increased    DKA---resolved    Pseudomonas--[+] blood cultures---on Cefepime--await IDS    See note below     All other ROS negative except noted in HPI    Diet:  ADULT DIET;  Full Liquid; 4 carb choices (60 gm/meal)    Medications:  Scheduled Meds:   [START ON 8/5/2021] spironolactone  50 mg Oral Daily    spironolactone        glimepiride  3 mg Oral Daily with breakfast    metoprolol tartrate  25 mg Oral BID    potassium bicarb-citric acid  60 mEq Oral BID    cefepime  2,000 mg Intravenous Q12H    pantoprazole  40 mg Intravenous BID    And    sodium chloride (PF)  10 mL Intravenous Daily    famotidine (PEPCID) injection  20 mg Intravenous BID    nystatin  5 mL Oral 4x Daily    fluconazole  200 mg Intravenous Q24H    Vitamin D  2,000 Units Oral Daily    enoxaparin  30 mg Subcutaneous BID    insulin glargine  20 Units Subcutaneous Nightly    insulin lispro  0-18 Units Subcutaneous TID WC    insulin lispro  0-9 Units Subcutaneous Nightly     Continuous Infusions:   0.9% NaCl with KCl 40 mEq 75 mL/hr at 08/03/21 2048    dextrose       PRN Meds:ondansetron, glucose, dextrose, glucagon (rDNA), dextrose, sodium phosphate IVPB **OR** sodium phosphate IVPB, dextrose, potassium chloride, magnesium sulfate, sodium phosphate IVPB **OR** sodium phosphate IVPB **OR** sodium phosphate IVPB, polyethylene glycol, guaiFENesin-dextromethorphan, acetaminophen **OR** acetaminophen    Objective:  Labs:  CBC with Differential:    Lab Results   Component Value Date    WBC 6.7 08/04/2021    RBC 4.52 08/04/2021    HGB 13.9 08/04/2021    HCT 38.3 08/04/2021 PLT See Reflexed IPF Result 08/04/2021    MCV 84.7 08/04/2021    MCH 30.8 08/04/2021    MCHC 36.3 08/04/2021    RDW 12.9 08/04/2021    LYMPHOPCT 29 08/04/2021    MONOPCT 10 08/04/2021    BASOPCT 0 08/04/2021    MONOSABS 0.67 08/04/2021    LYMPHSABS 1.95 08/04/2021    EOSABS <0.03 08/04/2021    BASOSABS 0.03 08/04/2021    DIFFTYPE NOT REPORTED 08/04/2021     BMP:    Lab Results   Component Value Date     08/04/2021    K 3.1 08/04/2021     08/04/2021    CO2 18 08/04/2021    BUN 6 08/04/2021    LABALBU 2.3 08/04/2021    CREATININE 0.44 08/04/2021    CALCIUM 8.1 08/04/2021    GFRAA >60 08/04/2021    LABGLOM >60 08/04/2021    GLUCOSE 131 08/04/2021           Physical Exam:  Vitals: /70   Pulse 88   Temp 97.5 °F (36.4 °C) (Oral)   Resp 21   Ht 6' (1.829 m)   Wt 186 lb 12.8 oz (84.7 kg)   SpO2 90%   BMI 25.33 kg/m²   24 hour intake/output:    Intake/Output Summary (Last 24 hours) at 8/4/2021 0940  Last data filed at 8/4/2021 0503  Gross per 24 hour   Intake 2980 ml   Output --   Net 2980 ml     Last 3 weights: Wt Readings from Last 3 Encounters:   08/04/21 186 lb 12.8 oz (84.7 kg)   04/04/17 241 lb 9.6 oz (109.6 kg)   08/25/16 268 lb 3.2 oz (121.7 kg)     HEENT: Normocephalic and Atraumatic  Neck: Supple, No Masses, Tenderness, Nodularity and No Lymphadenopathy  Chest/Lungs: Distant Breath Sounds  Cardiac: Regular Rate and Rhythm  GI/Abdomen: Bowel Sounds Present and Soft, Non-tender, without Guarding or Rebound Tenderness  : Not examined  EXT/Skin: No Edema, No Cyanosis and No Clubbing  Neuro: alert---generalized weakness--- and No Localizing Signs/Symptoms      Assessment:    Principal Problem:    DKA, type 2, not at goal [facility])  Active Problems:    Diabetic ketoacidosis without coma associated with diabetes mellitus due to underlying condition ([facility] [address]    Pneumonia due to COVID-19 virus    Hypokalemia    Pseudomonal bacteremia  Resolved Problems:    * No resolved hospital problems.  *    MIKE COCNEPCION     32  WM    [s Oksana, IM]  FULL CODE     LOVENOX      COVID--19--[+]--7.31.2021---no vaccine    SUPPLEMENTAL OXYGEN--currently RA    Anti-infectives:   [Rocephin IV, Zithromax IV, Zosyn IV]---dcd    Cefepime  IV, nystatin s/s, Diflucan IV     Event of multifocal tachycardia---8.3.2021--now SR  Hypokalemia---8.3.2021  DKA---7.31.2021            Elevated beta-hydroxybuterate---5.13  Covid-19---pneumonia---7.31.2021---source likely Kolbs co-workers with Covid-19----onset 7.29.2021           Elevated dehydration           CTA chest---pulmonary---7.31.2021---no PE--bilateral patchy pneumonia with area of confluent                                      consolidation at the anterolateral RLLL--moderate-to-severe mural thickening                                      of nearly the entire visualized esophagus suggestive of marked esophagitis----                                      old granulomatous disease---liver may be slightly enlarged             CXR---7.31.2021---NACs  Sinus tachycardia----rhythm strip---sinus tachycardia---128  Dehydration--7.3.1.2021    Pseudomonas aeruginosa--[+]--BLOOD CULTURE              2D ECHO---8.2.2021---NLVSF--NRVSF---normal appearing valves and chambers---normal                                   AR 3.4 cm--IVC not visualized--no obvious vegetations--no DD---LVEF ~ 60%       GERD---esophagitis  Dysphagia---possible due to GERD  Hyponatremia     Cerebral palsy          Left-sided hemiparesis          Gait-balance instability--falls  Hypertension   Hyperlipidemia  Headaches         Tension         Migraine  Elevated HgbA1c  PMH:  dizziness, old CHI  PSH:   none listed    Allergies:        NKDA  Intolerances:  Imitrex--nausea---vomiting        Plan:  1. Covid-19 PNA---cont'd IV antibiotics---respiratory regimen---O2 as required  2. Potassium replacement and increased aldactone  3. Pseudomonas bacteremia--cont'd Cefepime pending IDS  4.    See orders     Electronically signed by Vanna Mcardle on 8/4/2021 at 9:40 AM    Hospitalist

## 2021-08-04 NOTE — PROGRESS NOTES
Hospitalist Progress Note    Patient:  Cata Chopra     YOB: 1995    MRN: [id number]   Admit date: 7/31/2021     Acct: [de-identified]     PCP: No primary care provider on file. CC--Interval History: Covid-19 pneumonia, pseudomonas bacteremia -- IV Cefepime  Tachycardia -- improved  Hypokalemia  -- reocurring despite replacement  Hypophosphatemia -- reoccurring despite replacement   DMII -- glucose controlled      All other ROS negative except noted in HPI    Diet:  ADULT DIET;  Full Liquid; 4 carb choices (60 gm/meal)    Medications:  Scheduled Meds:   glimepiride  3 mg Oral Daily with breakfast    spironolactone  25 mg Oral Daily    metoprolol tartrate  25 mg Oral BID    potassium bicarb-citric acid  60 mEq Oral BID    cefepime  2,000 mg Intravenous Q12H    pantoprazole  40 mg Intravenous BID    And    sodium chloride (PF)  10 mL Intravenous Daily    famotidine (PEPCID) injection  20 mg Intravenous BID    nystatin  5 mL Oral 4x Daily    fluconazole  200 mg Intravenous Q24H    Vitamin D  2,000 Units Oral Daily    enoxaparin  30 mg Subcutaneous BID    insulin glargine  20 Units Subcutaneous Nightly    insulin lispro  0-18 Units Subcutaneous TID WC    insulin lispro  0-9 Units Subcutaneous Nightly     Continuous Infusions:   0.9% NaCl with KCl 40 mEq 75 mL/hr at 08/03/21 2048    dextrose      insulin Stopped (08/03/21 0212)     PRN Meds:ondansetron, glucose, dextrose, glucagon (rDNA), dextrose, sodium phosphate IVPB **OR** sodium phosphate IVPB, dextrose, potassium chloride, magnesium sulfate, sodium phosphate IVPB **OR** sodium phosphate IVPB **OR** sodium phosphate IVPB, polyethylene glycol, guaiFENesin-dextromethorphan, acetaminophen **OR** acetaminophen, albuterol sulfate HFA    Objective:  Pertinent Labs:  Serum potassium 2.6 --> 3.2 --> 2.9  Phosphorous 1.8 --> 1.5      Physical Exam:  Vitals: /60   Pulse 92   Temp 98.5 °F (36.9 °C) (Oral)   Resp 25   Ht 6' (1.829 m)   [facility] 181 lb 6.4 oz (82.3 kg)   SpO2 92%   BMI 24.60 kg/m²   24 hour intake/output:    Intake/Output Summary (Last 24 hours) at 8/4/2021 6012  Last data filed at 8/3/2021 1800  Gross per 24 hour   Intake 1675 ml   Output 1500 ml   Net 175 ml       General: awake, alert and cooperative  HEENT: External nose normal, Normocephalic, Atraumatic and Neck with full ROM  Neck: Supple, Carotid Pulses Present, No Masses, Tenderness, Nodularity and No Lymphadenopathy  Chest/Lungs: Distant Breath Sounds  Cardiac: Regular Rate and Rhythm and Pedal Pulses Palpable Bilaterally  GI/Abdomen: Bowel Sounds Present and Soft, Non-tender, without Guarding or Rebound Tenderness  : Not examined  Extremities/Musculoskeletal: All four extremities without edema and Generalized weakness  Skin: No Cyanosis, No rash and Skin warm and dry  Neuro: cerebral palsy, awake and alert and No Localizing Signs/Symptoms  Psychiatric: flat affect      Assessment:    Principal Problem:    DKA, type 2, not at goal [facility])  Active Problems:    Diabetic ketoacidosis without coma associated with diabetes mellitus due to underlying condition ([facility] [address]    Pneumonia due to COVID-19 virus    Hypokalemia  Resolved Problems:    * No resolved hospital problems. *        Plan:  1. Covid-19 pneumonia -- IV Cefepime, RT protocols, encourage prone positioning, supplemental oxygen prn  2. Hypokalemia -- replacing further and recheck in AM, con't aldactone, changed prn albuterol to prn xopenex  3. Tachycardia -- improved with beta blocker, monitor  4. DMII -- glucose controlled, IV insulin gtt has been off for almost 24 hours now, con't current treatment and monitor  5. Hypophosphatemia  -- replacing further and recheck in AM  6.  See orders    Electronically signed by KACEY Salcedo - CNP, NP-C, FNP-BC on 8/4/2021 at 2:19 AM    Hospitalist

## 2021-08-05 ENCOUNTER — APPOINTMENT (OUTPATIENT)
Dept: GENERAL RADIOLOGY | Age: 26
DRG: 177 | End: 2021-08-05

## 2021-08-05 LAB
ABSOLUTE EOS #: 0.03 K/UL (ref 0–0.44)
ABSOLUTE IMMATURE GRANULOCYTE: 0.13 K/UL (ref 0–0.3)
ABSOLUTE LYMPH #: 2.06 K/UL (ref 1.1–3.7)
ABSOLUTE MONO #: 0.97 K/UL (ref 0.1–1.2)
ALBUMIN SERPL-MCNC: 2.5 G/DL (ref 3.5–5.2)
ALBUMIN/GLOBULIN RATIO: 0.8 (ref 1–2.5)
ALP BLD-CCNC: 53 U/L (ref 40–129)
ALT SERPL-CCNC: 8 U/L (ref 5–41)
ANION GAP SERPL CALCULATED.3IONS-SCNC: 14 MMOL/L (ref 9–17)
AST SERPL-CCNC: 13 U/L
BASOPHILS # BLD: 0 % (ref 0–2)
BASOPHILS ABSOLUTE: 0.03 K/UL (ref 0–0.2)
BILIRUB SERPL-MCNC: 0.45 MG/DL (ref 0.3–1.2)
BUN BLDV-MCNC: 4 MG/DL (ref 6–20)
BUN/CREAT BLD: 10 (ref 9–20)
CALCIUM SERPL-MCNC: 8.2 MG/DL (ref 8.6–10.4)
CHLORIDE BLD-SCNC: 103 MMOL/L (ref 98–107)
CO2: 20 MMOL/L (ref 20–31)
CREAT SERPL-MCNC: 0.4 MG/DL (ref 0.7–1.2)
DIFFERENTIAL TYPE: ABNORMAL
EOSINOPHILS RELATIVE PERCENT: 0 % (ref 1–4)
GFR AFRICAN AMERICAN: >60 ML/MIN
GFR NON-AFRICAN AMERICAN: >60 ML/MIN
GFR SERPL CREATININE-BSD FRML MDRD: ABNORMAL ML/MIN/{1.73_M2}
GFR SERPL CREATININE-BSD FRML MDRD: ABNORMAL ML/MIN/{1.73_M2}
GLUCOSE BLD-MCNC: 104 MG/DL (ref 70–99)
GLUCOSE BLD-MCNC: 126 MG/DL (ref 75–110)
GLUCOSE BLD-MCNC: 190 MG/DL (ref 75–110)
GLUCOSE BLD-MCNC: 219 MG/DL (ref 75–110)
HCT VFR BLD CALC: 39.5 % (ref 40.7–50.3)
HEMOGLOBIN: 13.9 G/DL (ref 13–17)
IMMATURE GRANULOCYTES: 2 %
LYMPHOCYTES # BLD: 30 % (ref 24–43)
MCH RBC QN AUTO: 30.1 PG (ref 25.2–33.5)
MCHC RBC AUTO-ENTMCNC: 35.2 G/DL (ref 25.2–33.5)
MCV RBC AUTO: 85.5 FL (ref 82.6–102.9)
MONOCYTES # BLD: 14 % (ref 3–12)
NRBC AUTOMATED: 0 PER 100 WBC
PDW BLD-RTO: 12.9 % (ref 11.8–14.4)
PLATELET # BLD: 210 K/UL (ref 138–453)
PLATELET ESTIMATE: ABNORMAL
PMV BLD AUTO: 9.2 FL (ref 8.1–13.5)
POTASSIUM SERPL-SCNC: 3 MMOL/L (ref 3.7–5.3)
POTASSIUM SERPL-SCNC: 3.1 MMOL/L (ref 3.7–5.3)
POTASSIUM SERPL-SCNC: 3.9 MMOL/L (ref 3.7–5.3)
RBC # BLD: 4.62 M/UL (ref 4.21–5.77)
RBC # BLD: ABNORMAL 10*6/UL
SEG NEUTROPHILS: 54 % (ref 36–65)
SEGMENTED NEUTROPHILS ABSOLUTE COUNT: 3.72 K/UL (ref 1.5–8.1)
SODIUM BLD-SCNC: 137 MMOL/L (ref 135–144)
TOTAL PROTEIN: 5.5 G/DL (ref 6.4–8.3)
WBC # BLD: 6.9 K/UL (ref 3.5–11.3)
WBC # BLD: ABNORMAL 10*3/UL

## 2021-08-05 PROCEDURE — 99232 SBSQ HOSP IP/OBS MODERATE 35: CPT | Performed by: INTERNAL MEDICINE

## 2021-08-05 PROCEDURE — 80053 COMPREHEN METABOLIC PANEL: CPT

## 2021-08-05 PROCEDURE — 94761 N-INVAS EAR/PLS OXIMETRY MLT: CPT

## 2021-08-05 PROCEDURE — 82947 ASSAY GLUCOSE BLOOD QUANT: CPT

## 2021-08-05 PROCEDURE — 6360000002 HC RX W HCPCS: Performed by: NURSE PRACTITIONER

## 2021-08-05 PROCEDURE — 85025 COMPLETE CBC W/AUTO DIFF WBC: CPT

## 2021-08-05 PROCEDURE — 6370000000 HC RX 637 (ALT 250 FOR IP): Performed by: FAMILY MEDICINE

## 2021-08-05 PROCEDURE — 84132 ASSAY OF SERUM POTASSIUM: CPT

## 2021-08-05 PROCEDURE — 2700000000 HC OXYGEN THERAPY PER DAY

## 2021-08-05 PROCEDURE — XW033E5 INTRODUCTION OF REMDESIVIR ANTI-INFECTIVE INTO PERIPHERAL VEIN, PERCUTANEOUS APPROACH, NEW TECHNOLOGY GROUP 5: ICD-10-PCS | Performed by: INTERNAL MEDICINE

## 2021-08-05 PROCEDURE — 6370000000 HC RX 637 (ALT 250 FOR IP): Performed by: NURSE PRACTITIONER

## 2021-08-05 PROCEDURE — 36415 COLL VENOUS BLD VENIPUNCTURE: CPT

## 2021-08-05 PROCEDURE — 2580000003 HC RX 258: Performed by: INTERNAL MEDICINE

## 2021-08-05 PROCEDURE — C9113 INJ PANTOPRAZOLE SODIUM, VIA: HCPCS | Performed by: INTERNAL MEDICINE

## 2021-08-05 PROCEDURE — 71045 X-RAY EXAM CHEST 1 VIEW: CPT

## 2021-08-05 PROCEDURE — 6370000000 HC RX 637 (ALT 250 FOR IP): Performed by: INTERNAL MEDICINE

## 2021-08-05 PROCEDURE — 6360000002 HC RX W HCPCS: Performed by: INTERNAL MEDICINE

## 2021-08-05 PROCEDURE — 2500000003 HC RX 250 WO HCPCS: Performed by: INTERNAL MEDICINE

## 2021-08-05 PROCEDURE — 2000000000 HC ICU R&B

## 2021-08-05 RX ORDER — FLUCONAZOLE 100 MG/1
200 TABLET ORAL DAILY
Status: DISCONTINUED | OUTPATIENT
Start: 2021-08-05 | End: 2021-08-06 | Stop reason: HOSPADM

## 2021-08-05 RX ORDER — GLIMEPIRIDE 2 MG/1
4 TABLET ORAL
Status: DISCONTINUED | OUTPATIENT
Start: 2021-08-06 | End: 2021-08-06 | Stop reason: HOSPADM

## 2021-08-05 RX ORDER — DEXAMETHASONE SODIUM PHOSPHATE 4 MG/ML
6 INJECTION, SOLUTION INTRA-ARTICULAR; INTRALESIONAL; INTRAMUSCULAR; INTRAVENOUS; SOFT TISSUE EVERY 24 HOURS
Status: DISCONTINUED | OUTPATIENT
Start: 2021-08-05 | End: 2021-08-06 | Stop reason: HOSPADM

## 2021-08-05 RX ORDER — GUAIFENESIN 600 MG/1
600 TABLET, EXTENDED RELEASE ORAL 2 TIMES DAILY
Status: DISCONTINUED | OUTPATIENT
Start: 2021-08-05 | End: 2021-08-06 | Stop reason: HOSPADM

## 2021-08-05 RX ADMIN — DEXAMETHASONE SODIUM PHOSPHATE 6 MG: 4 INJECTION, SOLUTION INTRAMUSCULAR; INTRAVENOUS at 11:57

## 2021-08-05 RX ADMIN — ENOXAPARIN SODIUM 30 MG: 30 INJECTION SUBCUTANEOUS at 21:21

## 2021-08-05 RX ADMIN — POTASSIUM BICARBONATE 60 MEQ: 782 TABLET, EFFERVESCENT ORAL at 06:29

## 2021-08-05 RX ADMIN — SPIRONOLACTONE 50 MG: 25 TABLET ORAL at 08:26

## 2021-08-05 RX ADMIN — NYSTATIN 500000 UNITS: 100000 SUSPENSION ORAL at 18:13

## 2021-08-05 RX ADMIN — FLUCONAZOLE 200 MG: 100 TABLET ORAL at 12:57

## 2021-08-05 RX ADMIN — CEFEPIME 2000 MG: 2 INJECTION, POWDER, FOR SOLUTION INTRAVENOUS at 08:28

## 2021-08-05 RX ADMIN — SODIUM CHLORIDE, PRESERVATIVE FREE 10 ML: 5 INJECTION INTRAVENOUS at 08:26

## 2021-08-05 RX ADMIN — FAMOTIDINE 20 MG: 10 INJECTION, SOLUTION INTRAVENOUS at 08:26

## 2021-08-05 RX ADMIN — METOPROLOL TARTRATE 25 MG: 25 TABLET, FILM COATED ORAL at 08:27

## 2021-08-05 RX ADMIN — CEFEPIME 2000 MG: 2 INJECTION, POWDER, FOR SOLUTION INTRAVENOUS at 21:20

## 2021-08-05 RX ADMIN — REMDESIVIR 200 MG: 100 INJECTION, POWDER, LYOPHILIZED, FOR SOLUTION INTRAVENOUS at 12:58

## 2021-08-05 RX ADMIN — NYSTATIN 500000 UNITS: 100000 SUSPENSION ORAL at 08:27

## 2021-08-05 RX ADMIN — NYSTATIN 500000 UNITS: 100000 SUSPENSION ORAL at 11:57

## 2021-08-05 RX ADMIN — POTASSIUM BICARBONATE 60 MEQ: 782 TABLET, EFFERVESCENT ORAL at 21:21

## 2021-08-05 RX ADMIN — POTASSIUM BICARBONATE 60 MEQ: 782 TABLET, EFFERVESCENT ORAL at 09:55

## 2021-08-05 RX ADMIN — PANTOPRAZOLE SODIUM 40 MG: 40 INJECTION, POWDER, FOR SOLUTION INTRAVENOUS at 21:21

## 2021-08-05 RX ADMIN — FAMOTIDINE 20 MG: 10 INJECTION, SOLUTION INTRAVENOUS at 21:22

## 2021-08-05 RX ADMIN — INSULIN LISPRO 3 UNITS: 100 INJECTION, SOLUTION INTRAVENOUS; SUBCUTANEOUS at 21:23

## 2021-08-05 RX ADMIN — GUAIFENESIN 600 MG: 600 TABLET, EXTENDED RELEASE ORAL at 11:57

## 2021-08-05 RX ADMIN — SODIUM CHLORIDE, PRESERVATIVE FREE 10 ML: 5 INJECTION INTRAVENOUS at 08:28

## 2021-08-05 RX ADMIN — GUAIFENESIN 600 MG: 600 TABLET, EXTENDED RELEASE ORAL at 21:21

## 2021-08-05 RX ADMIN — METOPROLOL TARTRATE 25 MG: 25 TABLET, FILM COATED ORAL at 21:21

## 2021-08-05 RX ADMIN — NYSTATIN 500000 UNITS: 100000 SUSPENSION ORAL at 21:28

## 2021-08-05 RX ADMIN — INSULIN GLARGINE 20 UNITS: 100 INJECTION, SOLUTION SUBCUTANEOUS at 21:22

## 2021-08-05 RX ADMIN — POTASSIUM CHLORIDE AND SODIUM CHLORIDE: 900; 300 INJECTION, SOLUTION INTRAVENOUS at 12:58

## 2021-08-05 RX ADMIN — ENOXAPARIN SODIUM 30 MG: 30 INJECTION SUBCUTANEOUS at 08:26

## 2021-08-05 RX ADMIN — PANTOPRAZOLE SODIUM 40 MG: 40 INJECTION, POWDER, FOR SOLUTION INTRAVENOUS at 08:26

## 2021-08-05 RX ADMIN — GLIMEPIRIDE 3 MG: 1 TABLET ORAL at 08:26

## 2021-08-05 RX ADMIN — Medication 2000 UNITS: at 08:26

## 2021-08-05 RX ADMIN — INSULIN LISPRO 3 UNITS: 100 INJECTION, SOLUTION INTRAVENOUS; SUBCUTANEOUS at 18:21

## 2021-08-05 ASSESSMENT — PAIN SCALES - GENERAL
PAINLEVEL_OUTOF10: 0

## 2021-08-05 NOTE — PROGRESS NOTES
Remdesivir (RDV) Daily Monitoring    Please keep this Ivent open for the duration of remdesivir treatment and monitor daily for remdesivir-related adverse events. All serious adverse events must be reported at www.fda.gov/medwatch/report.htm within 7 days of the event. Call provider and recommend discontinuation of remdesivir if the following occur:    Avoid use for CrCl less than 30 ml/min due to risk of cytodextrin toxicity with IV solution as it accumulates in reduced renal clearance  Not recommended in patients with baseline ALT > 5 times the ULN (e.g., > 200 u/L)      Estimated Creatinine Clearance: 307 mL/min (A) (based on SCr of 0.4 mg/dL (L)).     Recent Labs     08/03/21 2031 08/04/21  0648 08/05/21  0526   CREATININE 0.50* 0.44* 0.40*       Recent Labs     08/02/21 1819 08/04/21  0648 08/05/21  0526   ALKPHOS  --  50 53   ALT  --  9 8   AST  --  13 13   PROT  --  5.2* 5.5*   BILITOT  --  0.39 0.45   BILIDIR  --  0.12  --    LABALBU 2.7* 2.3* 2.5*       [place], [place]  8/5/2021  12:10 PM

## 2021-08-05 NOTE — PLAN OF CARE
Problem: Airway Clearance - Ineffective  Goal: Achieve or maintain patent airway  Outcome: Ongoing     Problem: Gas Exchange - Impaired  Goal: Absence of hypoxia  Outcome: Ongoing  Goal: Promote optimal lung function  Outcome: Ongoing     Problem: Breathing Pattern - Ineffective  Goal: Ability to achieve and maintain a regular respiratory rate  Outcome: Ongoing     Problem:  Body Temperature -  Risk of, Imbalanced  Goal: Ability to maintain a body temperature within defined limits  Outcome: Ongoing  Goal: Will regain or maintain usual level of consciousness  Outcome: Ongoing  Goal: Complications related to the disease process, condition or treatment will be avoided or minimized  Outcome: Ongoing     Problem: Isolation Precautions - Risk of Spread of Infection  Goal: Prevent transmission of infection  Outcome: Ongoing     Problem: Nutrition Deficits  Goal: Optimize nutritional status  Outcome: Ongoing     Problem: Risk for Fluid Volume Deficit  Goal: Maintain normal heart rhythm  Outcome: Ongoing  Goal: Maintain absence of muscle cramping  Outcome: Ongoing  Goal: Maintain normal serum potassium, sodium, calcium, phosphorus, and pH  Outcome: Ongoing     Problem: Skin Integrity:  Goal: Will show no infection signs and symptoms  Description: Will show no infection signs and symptoms  Outcome: Ongoing  Goal: Absence of new skin breakdown  Description: Absence of new skin breakdown  Outcome: Ongoing

## 2021-08-05 NOTE — PROGRESS NOTES
Hospitalist Progress Note    Patient:  Murali Jewell     YOB: 1995    MRN: [id number]   Admit date: 7/31/2021     Acct: [de-identified]     PCP: No primary care provider on file. CC--Interval History: Covid-19 PNA--improved-back to RA--has completed course of Remdesivir---steroids added    Pseudomonas--bacteremia---on Cefepime to which it is sensitive    Hypokalemia----potassium replacement given    DKA---resolved-----DM2  BG = 104    See notes below     All other ROS negative except noted in HPI    Diet:  ADULT DIET;  Full Liquid; 4 carb choices (60 gm/meal)    Medications:  Scheduled Meds:   fluconazole  200 mg Oral Daily    spironolactone  50 mg Oral Daily    glimepiride  3 mg Oral Daily with breakfast    metoprolol tartrate  25 mg Oral BID    potassium bicarb-citric acid  60 mEq Oral BID    cefepime  2,000 mg Intravenous Q12H    pantoprazole  40 mg Intravenous BID    And    sodium chloride (PF)  10 mL Intravenous Daily    famotidine (PEPCID) injection  20 mg Intravenous BID    nystatin  5 mL Oral 4x Daily    Vitamin D  2,000 Units Oral Daily    enoxaparin  30 mg Subcutaneous BID    insulin glargine  20 Units Subcutaneous Nightly    insulin lispro  0-18 Units Subcutaneous TID WC    insulin lispro  0-9 Units Subcutaneous Nightly     Continuous Infusions:   0.9% NaCl with KCl 40 mEq 75 mL/hr at 08/04/21 2045    dextrose       PRN Meds:ondansetron, glucose, dextrose, glucagon (rDNA), dextrose, dextrose, potassium chloride, magnesium sulfate, polyethylene glycol, guaiFENesin-dextromethorphan, acetaminophen **OR** acetaminophen    Objective:  Labs:  CBC with Differential:    Lab Results   Component Value Date    WBC 6.9 08/05/2021    RBC 4.62 08/05/2021    HGB 13.9 08/05/2021    HCT 39.5 08/05/2021     08/05/2021    MCV 85.5 08/05/2021    MCH 30.1 08/05/2021    MCHC 35.2 08/05/2021    RDW 12.9 08/05/2021    LYMPHOPCT 30 08/05/2021    MONOPCT 14 08/05/2021    BASOPCT 0 08/05/2021 MONOSABS 0.97 08/05/2021    LYMPHSABS 2.06 08/05/2021    EOSABS 0.03 08/05/2021    BASOSABS 0.03 08/05/2021    DIFFTYPE NOT REPORTED 08/05/2021     BMP:    Lab Results   Component Value Date     08/05/2021    K 3.1 08/05/2021     08/05/2021    CO2 20 08/05/2021    BUN 4 08/05/2021    LABALBU 2.5 08/05/2021    CREATININE 0.40 08/05/2021    CALCIUM 8.2 08/05/2021    GFRAA >60 08/05/2021    LABGLOM >60 08/05/2021    GLUCOSE 104 08/05/2021           Physical Exam:  Vitals: /74   Pulse 84   Temp 97.9 °F (36.6 °C) (Oral)   Resp 17   Ht 6' (1.829 m)   Wt 182 lb 9.6 oz (82.8 kg)   SpO2 91%   BMI 24.77 kg/m²   24 hour intake/output:    Intake/Output Summary (Last 24 hours) at 8/5/2021 0742  Last data filed at 8/5/2021 0636  Gross per 24 hour   Intake 2186 ml   Output 650 ml   Net 1536 ml     Last 3 weights: Wt Readings from Last 3 Encounters:   08/05/21 182 lb 9.6 oz (82.8 kg)   04/04/17 241 lb 9.6 oz (109.6 kg)   08/25/16 268 lb 3.2 oz (121.7 kg)     HEENT: Normocephalic and Atraumatic  Neck: Supple, No Masses, Tenderness, Nodularity and No Lymphadenopathy  Chest/Lungs: Distant Breath Sounds  Cardiac: Regular Rate and Rhythm  GI/Abdomen:  Bowel Sounds Present and Soft, Non-tender, without Guarding or Rebound Tenderness  : Not examined  EXT/Skin: No Edema, No Cyanosis and No Clubbing  Neuro: alert---generalized weakness--CP [chronic mild left-sided weakness]      Assessment:    Principal Problem:    Hypokalemia  Active Problems:    Pneumonia due to COVID-19 virus    Pseudomonal bacteremia  Resolved Problems:    DKA, type 2, not at goal [facility])    Diabetic ketoacidosis without coma associated with diabetes mellitus due to underlying condition ([facility] [address]    CONCEPCION MCKEON     27  WM    [s Oksana, IM]  FULL CODE     LOVENOX      COVID--19--[+]--7.31.2021---no vaccine    SUPPLEMENTAL OXYGEN--currently RA    Anti-infectives:   [Rocephin IV, Zithromax IV, Zosyn IV]---dcd    Cefepime  IV, nystatin s/s, Diflucan IV, Remdesivir--dcd  [steroids]     Event of multifocal tachycardia---8.3.2021--now SR  Hypokalemia  DKA---7.31.2021            Elevated beta-hydroxybuterate---5.13  Covid-19---pneumonia---7.31.2021---source likely Kolbs co-workers with Covid-19----onset 7.29.2021           Elevated dehydration           CXR---8.5.2021--similar patchy bilateral infiltrates--right > left--compatible with Covid-19           CTA chest---pulmonary---7.31.2021---no PE--bilateral patchy pneumonia with area of confluent                                      consolidation at the anterolateral RLLL--moderate-to-severe mural thickening                                      of nearly the entire visualized esophagus suggestive of marked esophagitis----                                      old granulomatous disease---liver may be slightly enlarged             CXR---7.31.2021---NACs  Sinus tachycardia----rhythm strip---sinus tachycardia---128  Dehydration--7.3.1.2021    Pseudomonas aeruginosa--[+]--BLOOD CULTURE              2D ECHO---8.2.2021---NLVSF--NRVSF---normal appearing valves and chambers---normal                                   AR 3.4 cm--IVC not visualized--no obvious vegetations--no DD---LVEF ~ 60%       GERD---esophagitis  Dysphagia---possible due to GERD  Hyponatremia     Cerebral palsy          Left-sided hemiparesis          Gait-balance instability--falls  Hypertension   Hyperlipidemia  Headaches         Tension         Migraine  Elevated HgbA1c  PMH:  dizziness, old CHI  PSH:   none listed    Allergies:        NKDA  Intolerances:  Imitrex--nausea---vomiting          Plan:  1. Covid-19---add steroids---cont'd Cefepime--Diflucan  2. Potassium replacement  3. Pseudomonas--[+] blood culture---Cefepime  4.    See orders     Electronically signed by Bertha So on 8/5/2021 at 7:42 AM    Hospitalist

## 2021-08-05 NOTE — PROGRESS NOTES
SW contacted patient to coordinate services. Patient states he does not have his cell phone to contact HR regarding his health insurance information. SW provided patient with phone number for his employment who can assist him with contacting HR. SW will continue to monitor needs and assist as appropriate.          Electronically signed by ИРИНА Benoit on 8/5/2021 at 12:24 PM

## 2021-08-05 NOTE — ACP (ADVANCE CARE PLANNING)
Advance Care Planning     Advance Care Planning Activator (Inpatient)  Conversation Note      Date of ACP Conversation: 8/5/2021     Conversation Conducted with: Patient with Decision Making Capacity    ACP Activator: Flory Bose, [address] [place]:     Current Designated Health Care Decision Maker:     Primary Decision Maker: Luann Aguilar - Brother/Sister - [phone]  Click here to complete [address] including section of the Healthcare Decision Maker Relationship (ie \"Primary\")  Today we documented Decision Maker(s) consistent with Legal Next of Kin hierarchy. Care Preferences    Ventilation: \"If you were in your present state of health and suddenly became very ill and were unable to breathe on your own, what would your preference be about the use of a ventilator (breathing machine) if it were available to you? \"      Would the patient desire the use of ventilator (breathing machine)?: yes    \"If your health worsens and it becomes clear that your chance of recovery is unlikely, what would your preference be about the use of a ventilator (breathing machine) if it were available to you? \"     Would the patient desire the use of ventilator (breathing machine)?: Yes      Resuscitation  \"CPR works best to restart the heart when there is a sudden event, like a heart attack, in someone who is otherwise healthy. Unfortunately, CPR does not typically restart the heart for people who have serious health conditions or who are very sick. \"    \"In the event your heart stopped as a result of an underlying serious health condition, would you want attempts to be made to restart your heart (answer \"yes\" for attempt to resuscitate) or would you prefer a natural death (answer \"no\" for do not attempt to resuscitate)? \" yes       [] Yes   [] No   Educated Patient / Shahla Blank regarding differences between Advance Directives and portable DNR orders.     Length of ACP Conversation in minutes:  7 minutes    Conversation Outcomes:  [x] ACP discussion completed  [] Existing advance directive reviewed with patient; no changes to patient's previously recorded wishes  [] New Advance Directive completed  [] Portable Do Not Rescitate prepared for Provider review and signature  [] POLST/POST/MOLST/MOST prepared for Provider review and signature      Follow-up plan:    [] Schedule follow-up conversation to continue planning  [] Referred individual to Provider for additional questions/concerns   [] Advised patient/agent/surrogate to review completed ACP document and update if needed with changes in condition, patient preferences or care setting    [] This note routed to one or more involved healthcare providers

## 2021-08-06 ENCOUNTER — TELEPHONE (OUTPATIENT)
Dept: FAMILY MEDICINE CLINIC | Age: 26
End: 2021-08-06

## 2021-08-06 ENCOUNTER — APPOINTMENT (OUTPATIENT)
Dept: GENERAL RADIOLOGY | Age: 26
DRG: 177 | End: 2021-08-06

## 2021-08-06 VITALS
RESPIRATION RATE: 16 BRPM | OXYGEN SATURATION: 91 % | BODY MASS INDEX: 24.73 KG/M2 | SYSTOLIC BLOOD PRESSURE: 126 MMHG | HEIGHT: 72 IN | WEIGHT: 182.6 LBS | HEART RATE: 85 BPM | DIASTOLIC BLOOD PRESSURE: 79 MMHG | TEMPERATURE: 98 F

## 2021-08-06 LAB
ABSOLUTE EOS #: <0.03 K/UL (ref 0–0.44)
ABSOLUTE IMMATURE GRANULOCYTE: 0.3 K/UL (ref 0–0.3)
ABSOLUTE LYMPH #: 1.94 K/UL (ref 1.1–3.7)
ABSOLUTE MONO #: 0.94 K/UL (ref 0.1–1.2)
ALBUMIN SERPL-MCNC: 2.8 G/DL (ref 3.5–5.2)
ALBUMIN/GLOBULIN RATIO: 0.9 (ref 1–2.5)
ALP BLD-CCNC: 61 U/L (ref 40–129)
ALT SERPL-CCNC: 9 U/L (ref 5–41)
ANION GAP SERPL CALCULATED.3IONS-SCNC: 14 MMOL/L (ref 9–17)
AST SERPL-CCNC: 10 U/L
BASOPHILS # BLD: 1 % (ref 0–2)
BASOPHILS ABSOLUTE: 0.05 K/UL (ref 0–0.2)
BILIRUB SERPL-MCNC: 0.41 MG/DL (ref 0.3–1.2)
BUN BLDV-MCNC: 5 MG/DL (ref 6–20)
BUN/CREAT BLD: ABNORMAL (ref 9–20)
CALCIUM SERPL-MCNC: 8.8 MG/DL (ref 8.6–10.4)
CHLORIDE BLD-SCNC: 103 MMOL/L (ref 98–107)
CO2: 22 MMOL/L (ref 20–31)
CREAT SERPL-MCNC: <0.4 MG/DL (ref 0.7–1.2)
D-DIMER QUANTITATIVE: 1.01 MG/L FEU (ref 0–0.59)
DIFFERENTIAL TYPE: ABNORMAL
EOSINOPHILS RELATIVE PERCENT: 0 % (ref 1–4)
FERRITIN: 1044 UG/L (ref 30–400)
GFR AFRICAN AMERICAN: ABNORMAL ML/MIN
GFR NON-AFRICAN AMERICAN: ABNORMAL ML/MIN
GFR SERPL CREATININE-BSD FRML MDRD: ABNORMAL ML/MIN/{1.73_M2}
GFR SERPL CREATININE-BSD FRML MDRD: ABNORMAL ML/MIN/{1.73_M2}
GLUCOSE BLD-MCNC: 131 MG/DL (ref 75–110)
GLUCOSE BLD-MCNC: 135 MG/DL (ref 70–99)
GLUCOSE BLD-MCNC: 155 MG/DL (ref 75–110)
HCT VFR BLD CALC: 40 % (ref 40.7–50.3)
HEMOGLOBIN: 14.2 G/DL (ref 13–17)
IMMATURE GRANULOCYTES: 5 %
INR BLD: 1.3
LYMPHOCYTES # BLD: 30 % (ref 24–43)
MCH RBC QN AUTO: 30.5 PG (ref 25.2–33.5)
MCHC RBC AUTO-ENTMCNC: 35.5 G/DL (ref 25.2–33.5)
MCV RBC AUTO: 85.8 FL (ref 82.6–102.9)
MONOCYTES # BLD: 14 % (ref 3–12)
NRBC AUTOMATED: 0 PER 100 WBC
PDW BLD-RTO: 12.6 % (ref 11.8–14.4)
PLATELET # BLD: 327 K/UL (ref 138–453)
PLATELET ESTIMATE: ABNORMAL
PMV BLD AUTO: 9.1 FL (ref 8.1–13.5)
POTASSIUM SERPL-SCNC: 3.6 MMOL/L (ref 3.7–5.3)
PROTHROMBIN TIME: 15.3 SEC (ref 11.5–14.2)
RBC # BLD: 4.66 M/UL (ref 4.21–5.77)
RBC # BLD: ABNORMAL 10*6/UL
SEG NEUTROPHILS: 50 % (ref 36–65)
SEGMENTED NEUTROPHILS ABSOLUTE COUNT: 3.28 K/UL (ref 1.5–8.1)
SODIUM BLD-SCNC: 139 MMOL/L (ref 135–144)
TOTAL PROTEIN: 5.9 G/DL (ref 6.4–8.3)
WBC # BLD: 6.5 K/UL (ref 3.5–11.3)
WBC # BLD: ABNORMAL 10*3/UL

## 2021-08-06 PROCEDURE — 85379 FIBRIN DEGRADATION QUANT: CPT

## 2021-08-06 PROCEDURE — 85025 COMPLETE CBC W/AUTO DIFF WBC: CPT

## 2021-08-06 PROCEDURE — 6360000002 HC RX W HCPCS: Performed by: NURSE PRACTITIONER

## 2021-08-06 PROCEDURE — 71045 X-RAY EXAM CHEST 1 VIEW: CPT

## 2021-08-06 PROCEDURE — 6370000000 HC RX 637 (ALT 250 FOR IP): Performed by: FAMILY MEDICINE

## 2021-08-06 PROCEDURE — 85610 PROTHROMBIN TIME: CPT

## 2021-08-06 PROCEDURE — 94760 N-INVAS EAR/PLS OXIMETRY 1: CPT

## 2021-08-06 PROCEDURE — 6370000000 HC RX 637 (ALT 250 FOR IP): Performed by: NURSE PRACTITIONER

## 2021-08-06 PROCEDURE — C9113 INJ PANTOPRAZOLE SODIUM, VIA: HCPCS | Performed by: INTERNAL MEDICINE

## 2021-08-06 PROCEDURE — 82728 ASSAY OF FERRITIN: CPT

## 2021-08-06 PROCEDURE — 2580000003 HC RX 258: Performed by: INTERNAL MEDICINE

## 2021-08-06 PROCEDURE — 6360000002 HC RX W HCPCS: Performed by: INTERNAL MEDICINE

## 2021-08-06 PROCEDURE — 99238 HOSP IP/OBS DSCHRG MGMT 30/<: CPT | Performed by: INTERNAL MEDICINE

## 2021-08-06 PROCEDURE — 82947 ASSAY GLUCOSE BLOOD QUANT: CPT

## 2021-08-06 PROCEDURE — 2500000003 HC RX 250 WO HCPCS: Performed by: INTERNAL MEDICINE

## 2021-08-06 PROCEDURE — 36415 COLL VENOUS BLD VENIPUNCTURE: CPT

## 2021-08-06 PROCEDURE — 80053 COMPREHEN METABOLIC PANEL: CPT

## 2021-08-06 PROCEDURE — 6370000000 HC RX 637 (ALT 250 FOR IP): Performed by: INTERNAL MEDICINE

## 2021-08-06 RX ORDER — CHOLECALCIFEROL (VITAMIN D3) 50 MCG
2000 TABLET ORAL DAILY
Qty: 30 TABLET | Refills: 0 | Status: SHIPPED | OUTPATIENT
Start: 2021-08-07

## 2021-08-06 RX ORDER — SPIRONOLACTONE 50 MG/1
50 TABLET, FILM COATED ORAL DAILY
Qty: 30 TABLET | Refills: 0 | Status: SHIPPED | OUTPATIENT
Start: 2021-08-07

## 2021-08-06 RX ORDER — GLIMEPIRIDE 4 MG/1
4 TABLET ORAL
Qty: 30 TABLET | Refills: 0 | Status: SHIPPED | OUTPATIENT
Start: 2021-08-07

## 2021-08-06 RX ORDER — DEXAMETHASONE 6 MG/1
6 TABLET ORAL
Qty: 9 TABLET | Refills: 0 | Status: SHIPPED | OUTPATIENT
Start: 2021-08-06 | End: 2021-08-15

## 2021-08-06 RX ORDER — CIPROFLOXACIN 500 MG/1
500 TABLET, FILM COATED ORAL 2 TIMES DAILY
Qty: 18 TABLET | Refills: 0 | Status: SHIPPED | OUTPATIENT
Start: 2021-08-06 | End: 2021-08-15

## 2021-08-06 RX ORDER — GUAIFENESIN 600 MG/1
600 TABLET, EXTENDED RELEASE ORAL 2 TIMES DAILY
Qty: 30 TABLET | Refills: 0 | COMMUNITY
Start: 2021-08-06

## 2021-08-06 RX ADMIN — Medication 2000 UNITS: at 09:10

## 2021-08-06 RX ADMIN — ENOXAPARIN SODIUM 30 MG: 30 INJECTION SUBCUTANEOUS at 09:12

## 2021-08-06 RX ADMIN — SPIRONOLACTONE 50 MG: 25 TABLET ORAL at 09:09

## 2021-08-06 RX ADMIN — INSULIN LISPRO 3 UNITS: 100 INJECTION, SOLUTION INTRAVENOUS; SUBCUTANEOUS at 12:46

## 2021-08-06 RX ADMIN — FAMOTIDINE 20 MG: 10 INJECTION, SOLUTION INTRAVENOUS at 09:15

## 2021-08-06 RX ADMIN — METOPROLOL TARTRATE 25 MG: 25 TABLET, FILM COATED ORAL at 09:10

## 2021-08-06 RX ADMIN — DEXAMETHASONE SODIUM PHOSPHATE 6 MG: 4 INJECTION, SOLUTION INTRAMUSCULAR; INTRAVENOUS at 11:10

## 2021-08-06 RX ADMIN — REMDESIVIR 100 MG: 100 INJECTION, POWDER, LYOPHILIZED, FOR SOLUTION INTRAVENOUS at 12:46

## 2021-08-06 RX ADMIN — CEFEPIME 2000 MG: 2 INJECTION, POWDER, FOR SOLUTION INTRAVENOUS at 09:10

## 2021-08-06 RX ADMIN — SODIUM CHLORIDE, PRESERVATIVE FREE 10 ML: 5 INJECTION INTRAVENOUS at 09:15

## 2021-08-06 RX ADMIN — NYSTATIN 500000 UNITS: 100000 SUSPENSION ORAL at 09:09

## 2021-08-06 RX ADMIN — POTASSIUM BICARBONATE 60 MEQ: 782 TABLET, EFFERVESCENT ORAL at 09:09

## 2021-08-06 RX ADMIN — GLIMEPIRIDE 4 MG: 2 TABLET ORAL at 09:10

## 2021-08-06 RX ADMIN — PANTOPRAZOLE SODIUM 40 MG: 40 INJECTION, POWDER, FOR SOLUTION INTRAVENOUS at 09:15

## 2021-08-06 RX ADMIN — GUAIFENESIN 600 MG: 600 TABLET, EXTENDED RELEASE ORAL at 09:10

## 2021-08-06 RX ADMIN — POTASSIUM CHLORIDE AND SODIUM CHLORIDE: 900; 300 INJECTION, SOLUTION INTRAVENOUS at 06:03

## 2021-08-06 ASSESSMENT — PAIN SCALES - GENERAL: PAINLEVEL_OUTOF10: 0

## 2021-08-06 NOTE — DISCHARGE INSTR - DIET
Good nutrition is important when healing from an illness, injury, or surgery. Follow any nutrition recommendations given to you during your hospital stay. If you were given an oral nutrition supplement while in the hospital, continue to take this supplement at home. You can take it with meals, in-between meals, and/or before bedtime. These supplements can be purchased at most local grocery stores, pharmacies, and chain [employer]-stores. If you have any questions about your diet or nutrition, call the hospital and ask for the dietitian.   [employer], [facility], cardiac

## 2021-08-06 NOTE — TELEPHONE ENCOUNTER
PCU d/c 8/6/21- COVID    Patient does not have a pcp. He is scheduled with Dr. Niranjan Almaraz for hospital follow up. Patient dx of covid 7/29/21 ( also when symptoms started)     Patient will need transitional call placed.

## 2021-08-06 NOTE — PROGRESS NOTES
Hospitalist Progress Note    Patient:  Asia Torrez     YOB: 1995    MRN: [id number]   Admit date: 7/31/2021     Acct: [de-identified]     PCP: No primary care provider on file. CC--Interval History:   Covid--19---improved--on room air----home---8.6.2021    Pseudomonas aeruginosa bacteremia ---> Cefepime ---> Cipro    DM2--DKA resolved---Amaryl 4 mg PO daily    Tachycardia--associated with hypokalemia---potassium replacement and BB    See note below    All other ROS negative except noted in HPI    Diet:  ADULT DIET;  Regular; 4 carb choices (60 gm/meal)    Medications:  Scheduled Meds:   fluconazole  200 mg Oral Daily    dexamethasone  6 mg Intravenous Q24H    guaiFENesin  600 mg Oral BID    remdesivir IVPB  100 mg Intravenous Q24H    glimepiride  4 mg Oral Daily with breakfast    spironolactone  50 mg Oral Daily    metoprolol tartrate  25 mg Oral BID    potassium bicarb-citric acid  60 mEq Oral BID    cefepime  2,000 mg Intravenous Q12H    pantoprazole  40 mg Intravenous BID    And    sodium chloride (PF)  10 mL Intravenous Daily    famotidine (PEPCID) injection  20 mg Intravenous BID    nystatin  5 mL Oral 4x Daily    Vitamin D  2,000 Units Oral Daily    enoxaparin  30 mg Subcutaneous BID    insulin glargine  20 Units Subcutaneous Nightly    insulin lispro  0-18 Units Subcutaneous TID WC    insulin lispro  0-9 Units Subcutaneous Nightly     Continuous Infusions:   0.9% NaCl with KCl 40 mEq 50 mL/hr at 08/06/21 0603    dextrose       PRN Meds:ondansetron, glucose, dextrose, glucagon (rDNA), dextrose, dextrose, potassium chloride, magnesium sulfate, polyethylene glycol, guaiFENesin-dextromethorphan, acetaminophen **OR** acetaminophen    Objective:  Labs:  CBC with Differential:    Lab Results   Component Value Date    WBC 6.5 08/06/2021    RBC 4.66 08/06/2021    HGB 14.2 08/06/2021    HCT 40.0 08/06/2021     08/06/2021    MCV 85.8 08/06/2021    MCH 30.5 08/06/2021    MCHC 35.5 08/06/2021    RDW 12.6 08/06/2021    LYMPHOPCT 30 08/06/2021    MONOPCT 14 08/06/2021    BASOPCT 1 08/06/2021    MONOSABS 0.94 08/06/2021    LYMPHSABS 1.94 08/06/2021    EOSABS <0.03 08/06/2021    BASOSABS 0.05 08/06/2021    DIFFTYPE NOT REPORTED 08/06/2021     BMP:    Lab Results   Component Value Date     08/06/2021    K 3.6 08/06/2021     08/06/2021    CO2 22 08/06/2021    BUN 5 08/06/2021    LABALBU 2.8 08/06/2021    CREATININE <0.40 08/06/2021    CALCIUM 8.8 08/06/2021    GFRAA CANNOT BE CALCULATED 08/06/2021    LABGLOM CANNOT BE CALCULATED 08/06/2021    GLUCOSE 135 08/06/2021           Physical Exam:  Vitals: /79   Pulse 80   Temp 97.4 °F (36.3 °C) (Tympanic)   Resp 17   Ht 6' (1.829 m)   Wt 182 lb 9.6 oz (82.8 kg)   SpO2 94%   BMI 24.77 kg/m²   24 hour intake/output:    Intake/Output Summary (Last 24 hours) at 8/6/2021 0908  Last data filed at 8/6/2021 0038  Gross per 24 hour   Intake 1073 ml   Output 2950 ml   Net -1877 ml     Last 3 weights: Wt Readings from Last 3 Encounters:   08/05/21 182 lb 9.6 oz (82.8 kg)   04/04/17 241 lb 9.6 oz (109.6 kg)   08/25/16 268 lb 3.2 oz (121.7 kg)     HEENT: Normocephalic and Atraumatic  Neck: Supple, No Masses, Tenderness, Nodularity and No Lymphadenopathy  Chest/Lungs: Clear to Auscultation without Rales, Rhonchi, or Wheezes and Distant Breath Sounds  Cardiac: Regular Rate and Rhythm  GI/Abdomen:  Bowel Sounds Present and Soft, Non-tender, without Guarding or Rebound Tenderness  : Not examined  EXT/Skin: No Edema, No Cyanosis and No Clubbing  Neuro: alert--CP--left-sided hemiparesis---- and No Localizing Signs/Symptoms      Assessment:    Principal Problem:    Hypokalemia  Active Problems:    Pneumonia due to COVID-19 virus    Pseudomonal bacteremia  Resolved Problems:    DKA, type 2, not at goal [facility])    Diabetic ketoacidosis without coma associated with diabetes mellitus due to underlying condition ([facility]    Alexa Vallejo     27  WM    [s Oksana, IM]  FULL CODE     LOVENOX      COVID--19--[+]--7.31.2021---no vaccine    SUPPLEMENTAL OXYGEN--currently RA    Anti-infectives:   [Rocephin IV, Zithromax IV, Zosyn IV]---dcd    Cefepime  IV, nystatin s/s, Diflucan IV, Remdesivir--dcd  [steroids]     Event of multifocal tachycardia---8.3.2021--now SR  Hypokalemia  DKA---7.31.2021            Elevated beta-hydroxybuterate---5.13  Covid-19---pneumonia---7.31.2021---source likely Kolbs co-workers with Covid-19----onset 7.29.2021           CXR---8.6.2021--similar-to-slight improvement bilateral infiltrates           CXR---8.5.2021--similar patchy bilateral infiltrates--right > left--compatible with Covid-19           CTA chest---pulmonary---7.31.2021---no PE--bilateral patchy pneumonia with area of confluent                                      consolidation at the anterolateral RLLL--moderate-to-severe mural thickening                                      of nearly the entire visualized esophagus suggestive of marked esophagitis----                                      old granulomatous disease---liver may be slightly enlarged             CXR---7.31.2021---NACs  Sinus tachycardia----rhythm strip---sinus tachycardia---128  Dehydration--7.3.1.2021    Pseudomonas aeruginosa--[+]--BLOOD CULTURE              2D ECHO---8.2.2021---NLVSF--NRVSF---normal appearing valves and chambers---normal                                   AR 3.4 cm--IVC not visualized--no obvious vegetations--no DD---LVEF ~ 60%       GERD---esophagitis  Dysphagia---possible due to GERD  Hyponatremia     Cerebral palsy          Left-sided hemiparesis          Gait-balance instability--falls  Hypertension   Hyperlipidemia  Headaches         Tension         Migraine  Elevated HgbA1c  PMH:  dizziness, old CHI  PSH:   none listed    Allergies:        NKDA  Intolerances:  Imitrex--nausea---vomiting      Plan:  1. Covid-19--cont'd antibiotics--steroids--home quarantine   2.   Hypokalemia--potassium replacement--spironolactone  3. DM2--DKA---increased Amaryl to 4 mg PO daily  4. Tachycardia---Lopressor  5. Pseudomonas aeruginosa bacteremia---Cipro  6. Follow up Dr. Jhon Quijano, [facility]  7. Home---8.6.2021  8.   See orders    Electronically signed by Krysten De Leon on 8/6/2021 at 9:08 AM    Hospitalist

## 2021-08-07 LAB
CULTURE: NORMAL
Lab: NORMAL
SPECIMEN DESCRIPTION: NORMAL

## 2021-08-07 NOTE — DISCHARGE SUMMARY
Renan [address]                 [address] [place] [address], [address] [place] [address]                               DISCHARGE SUMMARY    PATIENT NAME: Elenita Ryan                      :        1995  MED REC NO:   [id number]                             ROOM:       0202  ACCOUNT NO:   [de-identified]                           ADMIT DATE: 2021  PROVIDER:     Suma Box. Lb Greene MD                  DISCHARGE DATE:  2021    ATTENDING PHYSICIAN OF HOSPITALIZATION:  Tate Alonzo MD    PERSONAL PHYSICIAN:  Ant Jalloh, Internal Medicine, [facility]. DIAGNOSES:  1.  COVID-19 pneumonia, 2021, source likely Kennedi's coworkers  with COVID-19. The patient, no vaccine, positive for COVID, 2021. Onset of 2021. Chest x-ray 2021, similar to slight  improvement, bilateral infiltrates. Chest x-ray 2021, similar  patchy bilateral infiltrates, right greater than left, compatible with  COVID-19. CTA chest/pulmonary, 2021, no pulmonary embolus,  bilateral patchy pneumonia with areas of confluent consolidation at the  anterolateral right lower lobe, moderate to severe mural thickening of  nearly the entire visualized esophagus suggestive of marked esophagitis. 2.  Old granulomatous disease, liver slightly enlarged? Chest x-ray,  2021, no acute changes. Rhythm strip, sinus tachycardia, rate  108. 3.  Dehydration, 2021. 4.  Pseudomonas aeruginosa blood culture positivity. 5.  A 2D echo of 2021, normal left ventricular systolic function,  normal right ventricular systolic function, normal-appearing valves and  chambers, normal AR at 3.4 cm, IVC not visualized, no obvious  vegetations, no diastolic dysfunction, LVEF 60%. 6.  Event of multifocal tachycardia, 2021, now sinus rhythm,  likely associated with hypokalemia. 7.  DKA, 2021.  8.  Hyponatremia. 9.  Dysphagia, possibly due to GERD.   10.  Cerebral palsy those set forth in the discharge  orders and no other medications should be taken. Any prior medications  not on the discharge orders are specifically discontinued.         Marybeth Mendez MD    D: 08/06/2021 13:37:46       T: 08/06/2021 13:41:56     /[email or web address]  Job#: [id number]     Doc#: [id number]    CC:

## 2021-08-09 ENCOUNTER — CARE COORDINATION (OUTPATIENT)
Dept: CASE MANAGEMENT | Age: 26
End: 2021-08-09

## 2021-08-09 NOTE — CARE COORDINATION
[facility] Transitions Initial Follow Up Call    Call within 2 business days of discharge: Yes    Patient: Bc Terrell Patient : 1995   MRN: <[id number]>  Reason for Admission: sob  Discharge Date: 21 RARS: Readmission Risk Score: 15      Last Discharge [facility]       Complaint Diagnosis Description Type Department Provider    21 Shortness of Breath Diabetic ketoacidosis without coma associated with diabetes mellitus due to underlying condition ([facility] [address] . .. ED to Hosp-Admission (Discharged) (ADMITTED) David Coulter, ... Date/Time:  2021 1:20 PM  Attempted to reach patient by telephone. Call within 2 business days of discharge: Yes  Unable to leave message, # unavailable at time of call. Writer tried [facility]   # left message Will attempt to reach patient again.     Facility: [address] [place] [address]    Non-face-to-face services provided:      Care Transitions 24 Hour Call    Care Transitions Interventions         Follow Up  Future Appointments   Date Time Provider Nevin Bello   2021 10:40 AM Madelene Riedel, MD [facility]       Vicki Montejo, RN

## 2021-08-10 ENCOUNTER — CARE COORDINATION (OUTPATIENT)
Dept: CASE MANAGEMENT | Age: 26
End: 2021-08-10

## 2021-08-13 ENCOUNTER — TELEPHONE (OUTPATIENT)
Dept: FAMILY MEDICINE CLINIC | Age: 26
End: 2021-08-13

## 2021-08-13 NOTE — TELEPHONE ENCOUNTER
Attempted to reach patient regarding missed appointment on 8/13/21. Unable to contact at this time. Letter mailed to reschedule.

## 2024-09-08 NOTE — FLOWSHEET NOTE
Insulin drip remains on hold due to glucose level within goal range with a multiplier of 0.  Orders reviewed and clarified with Fariha Brower NP 58